# Patient Record
Sex: MALE | Race: WHITE | NOT HISPANIC OR LATINO | Employment: FULL TIME | URBAN - METROPOLITAN AREA
[De-identification: names, ages, dates, MRNs, and addresses within clinical notes are randomized per-mention and may not be internally consistent; named-entity substitution may affect disease eponyms.]

---

## 2019-10-18 ENCOUNTER — OFFICE VISIT (OUTPATIENT)
Dept: INTERNAL MEDICINE CLINIC | Facility: CLINIC | Age: 49
End: 2019-10-18
Payer: OTHER GOVERNMENT

## 2019-10-18 VITALS
WEIGHT: 215.8 LBS | OXYGEN SATURATION: 97 % | HEART RATE: 83 BPM | HEIGHT: 71 IN | TEMPERATURE: 97.9 F | DIASTOLIC BLOOD PRESSURE: 80 MMHG | BODY MASS INDEX: 30.21 KG/M2 | SYSTOLIC BLOOD PRESSURE: 140 MMHG

## 2019-10-18 DIAGNOSIS — E03.9 ACQUIRED HYPOTHYROIDISM: Primary | ICD-10-CM

## 2019-10-18 DIAGNOSIS — E83.52 HYPERCALCEMIA: ICD-10-CM

## 2019-10-18 DIAGNOSIS — E21.0 PRIMARY HYPERPARATHYROIDISM (HCC): ICD-10-CM

## 2019-10-18 DIAGNOSIS — D35.1 PARATHYROID ADENOMA: ICD-10-CM

## 2019-10-18 DIAGNOSIS — M17.0 PRIMARY OSTEOARTHRITIS OF BOTH KNEES: ICD-10-CM

## 2019-10-18 PROBLEM — K64.8 OTHER HEMORRHOIDS: Status: ACTIVE | Noted: 2019-04-09

## 2019-10-18 PROBLEM — F17.200 SMOKING: Status: RESOLVED | Noted: 2019-10-18 | Resolved: 2019-10-18

## 2019-10-18 PROBLEM — F17.200 SMOKING: Status: ACTIVE | Noted: 2019-10-18

## 2019-10-18 PROCEDURE — 99203 OFFICE O/P NEW LOW 30 MIN: CPT | Performed by: INTERNAL MEDICINE

## 2019-10-18 RX ORDER — ERGOCALCIFEROL 1.25 MG/1
50000 CAPSULE ORAL
COMMUNITY
End: 2019-12-19

## 2019-10-18 RX ORDER — MULTIVITAMIN
1 TABLET ORAL DAILY
COMMUNITY

## 2019-10-18 RX ORDER — LEVOTHYROXINE SODIUM 175 UG/1
175 TABLET ORAL DAILY
COMMUNITY
End: 2019-12-19 | Stop reason: ALTCHOICE

## 2019-10-18 NOTE — PROGRESS NOTES
Assessment/Plan:     Diagnoses and all orders for this visit:    Acquired hypothyroidism  -     TSH, 3rd generation with Free T4 reflex; Future  -     Ambulatory referral to Endocrinology; Future    Parathyroid adenoma  -     Ambulatory Referral to Otolaryngology; Future  -     Ambulatory referral to Endocrinology; Future    Primary hyperparathyroidism [facility])  -     Ambulatory Referral to Otolaryngology; Future  -     Ambulatory referral to Endocrinology; Future    Hypercalcemia  -     Ambulatory Referral to Otolaryngology; Future  -     Ambulatory referral to Endocrinology; Future    Primary osteoarthritis of both knees  -     Ambulatory referral to Orthopedic Surgery; Future    Other orders  -     levothyroxine 175 mcg tablet; Take 175 mcg by mouth daily  -     ergocalciferol (VITAMIN D2) 50,000 units; Take 50,000 Units by mouth  -     Omega-3 Fatty Acids (FISH OIL OMEGA-3 PO); Take by mouth  -     Multiple Vitamin (MULTIVITAMIN) tablet; Take 1 tablet by mouth daily          Subjective:      Patient ID: Romayne Glass is a 51 y o  male who is establishing care today and also is here to discuss ongoing medical problems  Bam Sauer is in the [employer], and moved here recently from [place] as his wife is completing her Podiatry residency at [facility]  He brought with him [employer], which are skin to the records for access for review  Bam Sauer is a history of nephrolithiasis and while in [place] this led to lab testing showing idiopathic hypothyroidism with thyroid replacement, currently at a dose of 175 mcg per day    Other tests included blood chemistry showing elevated calcium, ultimately leading to further workup with diagnosis of primary hyperparathyroidism, with imaging study showing a marble-sized parathyroid adenoma on the left, with other 3 glands normal     He is maintaining good fluid intake with no clinical symptoms from hyperparathyroidism including no recurrent kidney stones, no bone pain, no constipation or abdominal pain, no alteration of sensorium  He is seeking follow-up for parathyroid gland removal   We discussed referral to ENT for consideration for surgery, after reviewing imaging studies and he does have a disc that he will bring with him to his ENT visit  We also discussed follow-up with [facility] End[facility]ology which was also range  Carleyrex Felix also has history of heavy use of his knees, as transition to a low-impact exercise program as required by the [employer]  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, both hips, left greater right as he tends to favor weight-bearing on the left leg as he has bilateral knee arthritis but anterior compartment arthritis there is particularly symptomatic in the right knee  He has responded to Synvisc injections and is interested in following with Orthopedics in this regard and was referred  He has a history of smoking having quit 5 years ago  He has no COPD based on history and exam or previous records  His records were reviewed including his healthcare questionnaire, and records from the [facility]  Further history was during interview  HPI  He reports no acute illness today  We discussed follow-up TSH and T4, with titration of thyroid dosing as needed  He was encouraged to call between visits for any questions or problems    The following portions of the patient's history were reviewed and updated as appropriate: allergies, current medications, past family history, past medical history, past social history, past surgical history and problem list     Review of Systems      Objective:      /80 (BP Location: Left arm, Patient Position: Sitting, Cuff Size: Standard)   Pulse 83   Temp 97 9 °F (36 6 °C) (Tympanic)   Ht 5' 11" (1 803 m)   Wt 97 9 kg (215 lb 12 8 oz)   SpO2 97%   BMI 30 10 kg/m²      Wt Readings from Last 3 Encounters:   10/18/19 97 9 kg (215 lb 12 8 oz)     Temp Readings from Last 3 Encounters:   10/18/19 97 9 °F (36 6 °C) (Tympanic)     BP Readings from Last 3 Encounters:   10/18/19 140/80     Pulse Readings from Last 3 Encounters:   10/18/19 83        Physical Exam    Vital signs stable, very pleasant gentleman in no distress  HEENT exam normal other than male pattern baldness  Cognitive status normal exam of neck:  No JVD and no adenopathy, trachea midline  On palpation of anterior neck there is a marble-sized mass and left thyroid lobe region  Lungs are clear and cardiac exam is normal on auscultation  AP diameter of the chest is normal and there is no other chest wall anatomic abnormality  Exam of knees show some mild medial compartment degeneration of both knees, without tenderness or swelling, no popliteal mass or tenderness  There is crepitus on passive range of motion of the right knee over the anterior compartment, with some mild tenderness on gentle palpation and compression of this joint  Distally circulation is intact without phlebitic findings  Patient Active Problem List   Diagnosis    Acquired hypothyroidism    Other hemorrhoids    Primary osteoarthritis of both knees    Hypercalcemia    Primary hyperpar[facility]roidism ([facility] [address] )    Parathyroid adenoma       Current Outpatient Medications on File Prior to Visit   Medication Sig Dispense Refill    ergocalciferol (VITAMIN D2) 50,000 units Take 50,000 Units by mouth      levothyroxine 175 mcg tablet Take 175 mcg by mouth daily      Multiple Vitamin (MULTIVITAMIN) tablet Take 1 tablet by mouth daily      Omega-3 Fatty Acids (FISH OIL OMEGA-3 PO) Take by mouth       No current facility-administered medications on file prior to visit

## 2019-10-31 ENCOUNTER — APPOINTMENT (OUTPATIENT)
Dept: RADIOLOGY | Facility: MEDICAL CENTER | Age: 49
End: 2019-10-31
Payer: OTHER GOVERNMENT

## 2019-10-31 ENCOUNTER — APPOINTMENT (OUTPATIENT)
Dept: LAB | Facility: MEDICAL CENTER | Age: 49
End: 2019-10-31
Payer: OTHER GOVERNMENT

## 2019-10-31 VITALS
SYSTOLIC BLOOD PRESSURE: 124 MMHG | HEIGHT: 71 IN | WEIGHT: 220 LBS | BODY MASS INDEX: 30.8 KG/M2 | HEART RATE: 62 BPM | DIASTOLIC BLOOD PRESSURE: 80 MMHG

## 2019-10-31 DIAGNOSIS — M17.11 PRIMARY OSTEOARTHRITIS OF RIGHT KNEE: ICD-10-CM

## 2019-10-31 DIAGNOSIS — M17.12 PRIMARY OSTEOARTHRITIS OF LEFT KNEE: ICD-10-CM

## 2019-10-31 DIAGNOSIS — M17.0 PRIMARY OSTEOARTHRITIS OF BOTH KNEES: ICD-10-CM

## 2019-10-31 DIAGNOSIS — E03.9 ACQUIRED HYPOTHYROIDISM: ICD-10-CM

## 2019-10-31 DIAGNOSIS — M17.10 PATELLOFEMORAL ARTHRITIS: ICD-10-CM

## 2019-10-31 DIAGNOSIS — M25.562 LEFT KNEE PAIN, UNSPECIFIED CHRONICITY: ICD-10-CM

## 2019-10-31 DIAGNOSIS — G89.29 CHRONIC PAIN OF RIGHT KNEE: ICD-10-CM

## 2019-10-31 DIAGNOSIS — M25.561 CHRONIC PAIN OF RIGHT KNEE: ICD-10-CM

## 2019-10-31 DIAGNOSIS — M17.0 PRIMARY OSTEOARTHRITIS OF BOTH KNEES: Primary | ICD-10-CM

## 2019-10-31 LAB — TSH SERPL DL<=0.05 MIU/L-ACNC: 1.59 UIU/ML (ref 0.36–3.74)

## 2019-10-31 PROCEDURE — 36415 COLL VENOUS BLD VENIPUNCTURE: CPT

## 2019-10-31 PROCEDURE — 84443 ASSAY THYROID STIM HORMONE: CPT

## 2019-10-31 PROCEDURE — 73564 X-RAY EXAM KNEE 4 OR MORE: CPT

## 2019-10-31 PROCEDURE — 99204 OFFICE O/P NEW MOD 45 MIN: CPT | Performed by: ORTHOPAEDIC SURGERY

## 2019-10-31 RX ORDER — DICLOFENAC SODIUM 75 MG/1
75 TABLET, DELAYED RELEASE ORAL 2 TIMES DAILY
Qty: 60 TABLET | Refills: 1 | Status: SHIPPED | OUTPATIENT
Start: 2019-10-31

## 2019-11-05 ENCOUNTER — TELEPHONE (OUTPATIENT)
Dept: INTERNAL MEDICINE CLINIC | Facility: CLINIC | Age: 49
End: 2019-11-05

## 2019-11-05 NOTE — TELEPHONE ENCOUNTER
Spoke to patient, Dr Rosalino Sharma can see the patient on 11/21 at 11:00 am  Pt is aware, he believes he is scheduled to travel that day but he will call me back if there is an issue      appt scheduled at:   [address], [address]  Driving Directions  Phone:  [phone]  Fax:  [phone]

## 2019-11-07 ENCOUNTER — OFFICE VISIT (OUTPATIENT)
Dept: OTOLARYNGOLOGY | Facility: CLINIC | Age: 49
End: 2019-11-07
Payer: OTHER GOVERNMENT

## 2019-11-07 ENCOUNTER — HOSPITAL ENCOUNTER (OUTPATIENT)
Dept: MRI IMAGING | Facility: HOSPITAL | Age: 49
Discharge: HOME | End: 2019-11-07
Attending: ORTHOPAEDIC SURGERY
Payer: OTHER GOVERNMENT

## 2019-11-07 VITALS
RESPIRATION RATE: 17 BRPM | HEART RATE: 70 BPM | SYSTOLIC BLOOD PRESSURE: 132 MMHG | DIASTOLIC BLOOD PRESSURE: 84 MMHG | BODY MASS INDEX: 29.89 KG/M2 | WEIGHT: 213.5 LBS | HEIGHT: 71 IN

## 2019-11-07 DIAGNOSIS — M17.11 PRIMARY OSTEOARTHRITIS OF RIGHT KNEE: ICD-10-CM

## 2019-11-07 DIAGNOSIS — G89.29 CHRONIC PAIN OF RIGHT KNEE: ICD-10-CM

## 2019-11-07 DIAGNOSIS — E03.8 OTHER SPECIFIED HYPOTHYROIDISM: ICD-10-CM

## 2019-11-07 DIAGNOSIS — M25.561 CHRONIC PAIN OF RIGHT KNEE: ICD-10-CM

## 2019-11-07 DIAGNOSIS — E21.3 HYPERPARATHYROIDISM (HCC): Primary | ICD-10-CM

## 2019-11-07 DIAGNOSIS — M17.10 PATELLOFEMORAL ARTHRITIS: ICD-10-CM

## 2019-11-07 PROCEDURE — 73721 MRI JNT OF LWR EXTRE W/O DYE: CPT

## 2019-11-07 PROCEDURE — 99204 OFFICE O/P NEW MOD 45 MIN: CPT | Performed by: OTOLARYNGOLOGY

## 2019-11-07 NOTE — PROGRESS NOTES
Otolaryngology Head and Neck Surgery History and Physical    Chief complaint    Chief Complaint   Patient presents with    Parathyroid Adenoma        History of the Present Illness    Nury Victoria is a 51 y o  who had kidney stones, under workup was noticed to have hypercalcemia  This prompted additional workup to rule out parathyroid adenoma  Parathyroid hormone was confirmed to be elevated, and a sestamibi scan was requested  This suggested location of the adenoma behind the left lower pole of the thyroid  A CT scan neck with contrast was also performed and confirmed the presence of a 16 mm ovoid mass highly suggestive of a parathyroid adenoma on the tracheoesophageal groove on the left side  Incidentally patient has also been recently diagnosed as having hypothyroidism and is on hormonal supplementation with levothyroxine  Review of Systems   Constitutional: Positive for fatigue  HENT: Negative  Eyes: Positive for visual disturbance  Respiratory: Negative  Cardiovascular: Negative  Gastrointestinal: Negative  Endocrine: Negative  Genitourinary: Negative  Musculoskeletal: Positive for back pain  Skin: Negative  Allergic/Immunologic: Negative  Neurological: Positive for headaches  Hematological: Negative  Psychiatric/Behavioral: Negative  Complete review of systems done as above  History reviewed  No pertinent past medical history  History reviewed  No pertinent surgical history      Social History     Socioeconomic History    Marital status: /Civil Union     Spouse name: Not on file    Number of children: Not on file    Years of education: Not on file    Highest education level: Not on file   Occupational History    Not on file   Social Needs    Financial resource strain: Not on file    Food insecurity:     Worry: Not on file     Inability: Not on file    Transportation needs:     Medical: Not on file     Non-medical: Not on file Tobacco Use    Smoking status: Never Smoker    Smokeless tobacco: Never Used   Substance and Sexual Activity    Alcohol use: Yes     Comment: ocassionally     Drug use: Never    Sexual activity: Yes   Lifestyle    Physical activity:     Days per week: Not on file     Minutes per session: Not on file    Stress: Not on file   Relationships    Social connections:     Talks on phone: Not on file     Gets together: Not on file     Attends [employer] service: Not on file     Active member of club or organization: Not on file     Attends meetings of clubs or organizations: Not on file     Relationship status: Not on file    Intimate partner violence:     Fear of current or ex partner: Not on file     Emotionally abused: Not on file     Physically abused: Not on file     Forced sexual activity: Not on file   Other Topics Concern    Not on file   Social History Narrative    Not on file       History reviewed  No pertinent family history  /84 (BP Location: Right arm, Patient Position: Sitting, Cuff Size: Standard)   Pulse 70   Resp 17   Ht 5' 11" (1 803 m)   Wt 96 8 kg (213 lb 8 oz)   BMI 29 78 kg/m²       Current Outpatient Medications:     diclofenac (VOLTAREN) 75 mg EC tablet, Take 1 tablet (75 mg total) by mouth 2 (two) times a day PRN for pain, Disp: 60 tablet, Rfl: 1    ergocalciferol (VITAMIN D2) 50,000 units, Take 50,000 Units by mouth, Disp: , Rfl:     levothyroxine 175 mcg tablet, Take 175 mcg by mouth daily, Disp: , Rfl:     Multiple Vitamin (MULTIVITAMIN) tablet, Take 1 tablet by mouth daily, Disp: , Rfl:     Omega-3 Fatty Acids (FISH OIL OMEGA-3 PO), Take by mouth, Disp: , Rfl:      Physical Exam        Constitutional: Oriented to person, place, and time  Well-developed and well-nourished, no apparent distress, non-toxic appearance  Cooperative, able to hear and answer questions without difficulty  Voice: Normal voice quality  Head: Normocephalic, atraumatic    No scars, masses or lesions  Face: Symmetric, no edema, no sinus tenderness  Eyes: Vision grossly intact, extra-ocular movement intact  Right Ear: External ear normal   Auditory canal clear  Tympanic membrane well-appearing, without retraction or scarring  No fluid present  No post-auricular erythema or tenderness  Left Ear: External ear normal   Auditory canal clear  Tympanic membrane well-appearing, without retraction or scarring  No fluid present  No post-auricular erythema or tenderness  Nose: Septum midline, nares clear  Mucosa moist, turbinates well appearing  No crusting, polyps or discharge evident  Oral cavity: Dentition intact  Mucosa moist, lips normal   Tongue mobile, floor of mouth normal   Hard palate unremarkable  No masses or lesions  Oropharynx: Uvula is midline, soft palate normal   Unremarkable oropharyngeal inlet  Tonsils unremarkable  Posterior pharyngeal wall clear  No masses or lesions  Salivary glands:  Parotid glands and submandibular glands symmetric, no enlargement or tenderness  Neck: Normal laryngeal elevation with swallow  Trachea midline  No masses or lesions  No palpable adenopathy  Thyroid: normal in size, unremarkable without tenderness or palpable nodules  Pulmonary/Chest: Normal effort and rate  No respiratory distress  Musculoskeletal: Normal range of motion  Neurological: Cranial nerves 2-12 intact  Skin: Skin is warm and dry  Psychiatric: Normal mood and affect  Imaging studies:  I personally reviewed images from CT provided by the patient  This confirms the presence of a parathyroid adenoma or a ovoid mass very suspicious for a parathyroid adenoma on the tracheoesophageal groove on the left side  Pertinent laboratory data:  Parathyroid hormone 65 pg/mL with ionized calcium of 1 4, total calcium 10 0 (pages 36 - 37 of the copy of documents from [facility]           Assessment and plan:    1   Hyperparathyroidism ([facility] [address] )  PTH, Intact and Calcium   2  Other specified hypothyroidism       Patient with clinical picture very consistent with hyperparathyroidism secondary to a parathyroid adenoma, parathyroid hormone is within the normal range despite a very elevated calcium  I did request an updated PTH and calcium levels  With confirmation of this values he will need surgery to remove the parathyroid adenoma

## 2019-11-12 ENCOUNTER — APPOINTMENT (OUTPATIENT)
Dept: RADIOLOGY | Facility: MEDICAL CENTER | Age: 49
End: 2019-11-12
Payer: OTHER GOVERNMENT

## 2019-11-12 VITALS
WEIGHT: 211 LBS | DIASTOLIC BLOOD PRESSURE: 77 MMHG | BODY MASS INDEX: 29.54 KG/M2 | HEART RATE: 66 BPM | SYSTOLIC BLOOD PRESSURE: 115 MMHG | HEIGHT: 71 IN

## 2019-11-12 DIAGNOSIS — M25.531 RIGHT WRIST PAIN: ICD-10-CM

## 2019-11-12 DIAGNOSIS — M25.511 PAIN IN RIGHT SHOULDER: Primary | ICD-10-CM

## 2019-11-12 DIAGNOSIS — M25.511 RIGHT SHOULDER PAIN, UNSPECIFIED CHRONICITY: ICD-10-CM

## 2019-11-12 DIAGNOSIS — M25.531 PAIN IN RIGHT WRIST: ICD-10-CM

## 2019-11-12 DIAGNOSIS — M25.511 CHRONIC RIGHT SHOULDER PAIN: Primary | ICD-10-CM

## 2019-11-12 DIAGNOSIS — G89.29 CHRONIC RIGHT SHOULDER PAIN: Primary | ICD-10-CM

## 2019-11-12 PROCEDURE — 99213 OFFICE O/P EST LOW 20 MIN: CPT | Performed by: ORTHOPAEDIC SURGERY

## 2019-11-12 PROCEDURE — 73030 X-RAY EXAM OF SHOULDER: CPT

## 2019-11-12 NOTE — PROGRESS NOTES
Orthopaedic Surgery - Office Note  Taurus Louis (51 y o  male)   : 1970   MRN: [id number]  Encounter Date: 2019    Chief Complaint   Patient presents with    Right Shoulder - Pain       Assessment / Plan  Chronic right shoulder pain     · MRI Arthrogram of right shoulder  Return for Recheck after MRI  History of Present Illness  Taurus Louis is a 51 y o  male who presents with right shoulder pain that has been ongoing for the past 2 years  Patient states that he fell backwards on an outstretched arm and braced his fall with his right arm while on rollerskates  Patient states at that time he was treated for a humeral head bone bruise where he did physical therapy for 9 months  Patient states that he never fully recovered from this injury and notices limited range of motion and a pretty moderate intermittent sharp and achy pain deep inside his shoulder joint  Patient describes difficulties doing bar hangs  Patient is in the [employer]  He also describes pain when lifting heavy items and putting them up on a shelf overhead  Patient denies numbness and tingling  Review of Systems  Pertinent items are noted in HPI  All other systems were reviewed and are negative  Physical Exam  /77   Pulse 66   Ht 5' 11" (1 803 m)   Wt 95 7 kg (211 lb)   BMI 29 43 kg/m²   Cons: Appears well  No apparent distress  Psych: Alert  Oriented x3  Mood and affect normal   Eyes: PERRLA, EOMI  Resp: Normal effort  No audible wheezing or stridor  CV: Palpable pulse  No discernable arrhythmia  No LE edema  Lymph:  No palpable cervical, axillary, or inguinal lymphadenopathy  Skin: Warm  No palpable masses  No visible lesions  Neuro: Normal muscle tone  Normal and symmetric DTR's  Right Shoulder Exam  Alignment / Posture:  Normal shoulder posture  Inspection:  No swelling  Palpation:  posterior glenohumeral joint tenderness  ROM:  Shoulder   Shoulder ER 70   Shoulder IR T8   Strength:  5/5 supraspinatus, infraspinatus, and subscapularis  Stability:  No objective shoulder instability  Tests: (+) Donald  (+) Meryl Homans  (-) Neer  (-) Belly press  (-) Speed  (-) Throwing test   Neurovascular:  Sensation intact in Ax/R/M/U nerve distributions  2+ radial pulse  Studies Reviewed  I have personally reviewed pertinent films in PACS and my interpretation is xray of right shoulder on 11/12/19 demonstrates no acute fractures or dislocations without degenerative changes  Procedures  No procedures today  Medical, Surgical, Family, and Social History  The patient's medical history, family history, and social history, were reviewed and updated as appropriate  History reviewed  No pertinent past medical history  History reviewed  No pertinent surgical history  History reviewed  No pertinent family history      Social History     Occupational History    Not on file   Tobacco Use    Smoking status: Never Smoker    Smokeless tobacco: Never Used   Substance and Sexual Activity    Alcohol use: Yes     Comment: ocassionally     Drug use: Never    Sexual activity: Yes       Allergies   Allergen Reactions    Meloxicam Dermatitis         Current Outpatient Medications:     diclofenac (VOLTAREN) 75 mg EC tablet, Take 1 tablet (75 mg total) by mouth 2 (two) times a day PRN for pain, Disp: 60 tablet, Rfl: 1    ergocalciferol (VITAMIN D2) 50,000 units, Take 50,000 Units by mouth, Disp: , Rfl:     levothyroxine 175 mcg tablet, Take 175 mcg by mouth daily, Disp: , Rfl:     Multiple Vitamin (MULTIVITAMIN) tablet, Take 1 tablet by mouth daily, Disp: , Rfl:     Omega-3 Fatty Acids (FISH OIL OMEGA-3 PO), Take by mouth, Disp: , Rfl:         Scribe Attestation    I,:   Susan Dobson am acting as a scribe while in the presence of the attending physician :        I,:   Gorge Quispe MD personally performed the services described in this documentation    as scribed in my presence :

## 2019-11-14 VITALS
HEART RATE: 61 BPM | BODY MASS INDEX: 30.1 KG/M2 | WEIGHT: 215 LBS | SYSTOLIC BLOOD PRESSURE: 124 MMHG | HEIGHT: 71 IN | DIASTOLIC BLOOD PRESSURE: 72 MMHG

## 2019-11-14 DIAGNOSIS — S63.8X1A TEAR OF RIGHT SCAPHOLUNATE LIGAMENT, INITIAL ENCOUNTER: Primary | ICD-10-CM

## 2019-11-14 DIAGNOSIS — M67.439 DORSAL WRIST GANGLION: ICD-10-CM

## 2019-11-14 DIAGNOSIS — M25.531 RIGHT WRIST PAIN: ICD-10-CM

## 2019-11-14 PROCEDURE — 20612 ASPIRATE/INJ GANGLION CYST: CPT | Performed by: ORTHOPAEDIC SURGERY

## 2019-11-14 PROCEDURE — 99214 OFFICE O/P EST MOD 30 MIN: CPT | Performed by: ORTHOPAEDIC SURGERY

## 2019-11-14 RX ADMIN — LIDOCAINE HYDROCHLORIDE 1 ML: 10 INJECTION, SOLUTION INFILTRATION; PERINEURAL at 17:27

## 2019-11-14 RX ADMIN — BETAMETHASONE SODIUM PHOSPHATE AND BETAMETHASONE ACETATE 6 MG: 3; 3 INJECTION, SUSPENSION INTRA-ARTICULAR; INTRALESIONAL; INTRAMUSCULAR; SOFT TISSUE at 17:27

## 2019-11-14 NOTE — LETTER
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 [facility] [employer] presents with right wrist pain  He has had multiple months of wrist pain  He denies any previous injury  He notes that his worst with weight-bearing with his wrist extension  Denies numbness or tingling  Has occasional popping in the wrist   Notes some weakness  No wrist brace or injections given  History reviewed  No pertinent past medical history  Past Surgical History:   Procedure Laterality Date    KIDNEY STONE SURGERY      VARICOCELE EXCISION         Allergies   Allergen Reactions    Meloxicam Dermatitis       Current Outpatient Medications on File Prior to Visit   Medication Sig Dispense Refill    diclofenac (VOLTAREN) 75 mg EC tablet Take 1 tablet (75 mg total) by mouth 2 (two) times a day PRN for pain 60 tablet 1    ergocalciferol (VITAMIN D2) 50,000 units Take 50,000 Units by mouth      levothyroxine 175 mcg tablet Take 175 mcg by mouth daily      Multiple Vitamin (MULTIVITAMIN) tablet Take 1 tablet by mouth daily      Omega-3 Fatty Acids (FISH OIL OMEGA-3 PO) Take by mouth       No current facility-administered medications on file prior to visit          Social History     Tobacco Use    Smoking status: Former Smoker     Start date: 2013    Smokeless tobacco: Current User     Types: Chew   Substance Use Topics    Alcohol use: Yes     Frequency: 2-4 times a month     Comment: ocassionally     Drug use: Never       Family History   Problem Relation Age of Onset    Diabetes Mother     No Known Problems Father        Review of Systems     As stated in the HPI  All other systems were reviewed and are negative  Physical Exam     /72   Pulse 61   Ht 5' 11" (1 803 m)   Wt 97 5 kg (215 lb)   BMI 29 99 kg/m²      GENERAL: This is a well-developed, well-nourished, age-appropriate patient in no acute distress  The patient is alert and oriented x3  Pleasant and cooperative  Eyes: Anicteric sclerae  Extraocular movements appear intact  HENT: Nares are patent with no drainage  Lungs: There is equal chest rise on inspection  Breathing is non-labored with no audible wheezing  Cardiovascular: No cyanosis  No upper extremity lymphadema  Skin: Skin is warm to touch  No obvious skin lesions or rashes other than described below  Neurologic: No ataxia  Psychiatric: Mood and affect are appropriate  Examination the right upper extremity reveals a small lobular mass that is visible with terminal wrist flexion on the dorsal radial wrist   It is palpable and tender at the SL interval   Full digital range of motion  5/5 Motor to the APB, FDI, FDP2, FDP5, EDC  Sensation intact to light touch in the median, radial, and ulnar nerve distribution  Brisk capillary refill      Data Review     Results Reviewed     None             Imaging: Three-view imaging of the right wrist taken today and reviewed personally by me shows no evidence of SL diastasis distal or arthritis  Assessment and Plan      Diagnoses and all orders for this visit:    Right wrist pain  -     Ambulatory referral to Orthopedic Surgery  -     XR wrist 3+ vw right; Future           Right dorsal wrist ganglion    I discussed the etiology and natural course of a ganglion with the patient    We discussed that these originate from joints or tendons and often have a stalk that comes out from the structure that axis of valve to move fluid from either the joint or the tendon into a balloon like structure  The fluid can move into the ganglion and allowed to grow, or recede  This is often based on activity and inflammation  Treatment of this condition was discussed as well  Nonsurgical management involves watchful waiting as many of these will resolve with rest, bracing, and anti-inflammatory medications, or an aspiration and injection with corticosteroid  Aspiration and injection, specifically of dorsal wrist ganglions, typically leads to alleviation of the ganglion in about 50% of patients  Surgical excision was also discussed and this comes with an 80% success rate for volar wrist ganglions and 92% success rate with dorsal wrist ganglions in terms of prevention of recurrence  Risks of the injection including pain, infection, tendon rupture, progression of arthritis, fat atrophy, depigmentation, and worsening of symptoms were all discussed with the patient  There was good understanding by the patient and they wish to proceed  Patient elected for aspiration injection today  We will also get him a wrist brace to wear during activities only for the next 4 weeks  Follow Up:  P r n      To Do Next Visit:     PROCEDURES PERFORMED:  Hand/upper extremity injection  Date/Time: 11/14/2019 5:27 PM  Consent given by: patient  Site marked: site marked  Timeout: Immediately prior to procedure a time out was called to verify the correct patient, procedure, equipment, support staff and site/side marked as required   Supporting Documentation  Indications: joint swelling and pain   Procedure Details  Condition:dorsal carpal ganglion Preparation: Patient was prepped and draped in the usual sterile fashion  Needle size: 25 G  Approach: dorsal  Medications administered: 1 mL lidocaine 1 %; 6 mg betamethasone acetate-betamethasone sodium phosphate 6 (3-3) mg/mL    Patient tolerance: patient tolerated the procedure well with no immediate complications  Dressing:  Sterile dressing applied

## 2019-11-14 NOTE — LETTER
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[employer] [facility] presents with right wrist pain  He has had multiple months of wrist pain  He denies any previous injury  He notes that his worst with weight-bearing with his wrist extension  Denies numbness or tingling  Has occasional popping in the wrist   Notes some weakness  No wrist brace or injections given  History reviewed  No pertinent past medical history  Past Surgical History:   Procedure Laterality Date    KIDNEY STONE SURGERY      VARICOCELE EXCISION         Allergies   Allergen Reactions    Meloxicam Dermatitis       Current Outpatient Medications on File Prior to Visit   Medication Sig Dispense Refill    diclofenac (VOLTAREN) 75 mg EC tablet Take 1 tablet (75 mg total) by mouth 2 (two) times a day PRN for pain 60 tablet 1    ergocalciferol (VITAMIN D2) 50,000 units Take 50,000 Units by mouth      levothyroxine 175 mcg tablet Take 175 mcg by mouth daily      Multiple Vitamin (MULTIVITAMIN) tablet Take 1 tablet by mouth daily      Omega-3 Fatty Acids (FISH OIL OMEGA-3 PO) Take by mouth       No current facility-administered medications on file prior to visit          Social History     Tobacco Use    Smoking status: Former Smoker     Start date: 2013    Smokeless tobacco: Current User     Types: Chew   Substance Use Topics    Alcohol use: Yes     Frequency: 2-4 times a month     Comment: ocassionally     Drug use: Never       Family History   Problem Relation Age of Onset    Diabetes Mother     No Known Problems Father        Review of Systems     As stated in the HPI  All other systems were reviewed and are negative  Physical Exam     /72   Pulse 61   Ht 5' 11" (1 803 m)   Wt 97 5 kg (215 lb)   BMI 29 99 kg/m²      GENERAL: This is a well-developed, well-nourished, age-appropriate patient in no acute distress  The patient is alert and oriented x3  Pleasant and cooperative  Eyes: Anicteric sclerae  Extraocular movements appear intact  HENT: Nares are patent with no drainage  Lungs: There is equal chest rise on inspection  Breathing is non-labored with no audible wheezing  Cardiovascular: No cyanosis  No upper extremity lymphadema  Skin: Skin is warm to touch  No obvious skin lesions or rashes other than described below  Neurologic: No ataxia  Psychiatric: Mood and affect are appropriate  Examination the right upper extremity reveals a small lobular mass that is visible with terminal wrist flexion on the dorsal radial wrist   It is palpable and tender at the SL interval   Full digital range of motion  5/5 Motor to the APB, FDI, FDP2, FDP5, EDC  Sensation intact to light touch in the median, radial, and ulnar nerve distribution  Brisk capillary refill      Data Review     Results Reviewed     None             Imaging: Three-view imaging of the right wrist taken today and reviewed personally by me shows evidence of SL diastasis distal with no arthritis    Assessment and Plan      Diagnoses and all orders for this visit:    Right wrist pain  -     Ambulatory referral to Orthopedic Surgery  -     XR wrist 3+ vw right; Future           Right dorsal wrist ganglion    I discussed the etiology and natural course of a ganglion with the patient    We discussed that these originate from joints or tendons and often have a stalk that comes out from the structure that axis of valve to move fluid from either the joint or the tendon into a balloon like structure  The fluid can move into the ganglion and allowed to grow, or recede  This is often based on activity and inflammation  Treatment of this condition was discussed as well  Nonsurgical management involves watchful waiting as many of these will resolve with rest, bracing, and anti-inflammatory medications, or an aspiration and injection with corticosteroid  Aspiration and injection, specifically of dorsal wrist ganglions, typically leads to alleviation of the ganglion in about 50% of patients  Surgical excision was also discussed and this comes with an 80% success rate for volar wrist ganglions and 92% success rate with dorsal wrist ganglions in terms of prevention of recurrence  Risks of the injection including pain, infection, tendon rupture, progression of arthritis, fat atrophy, depigmentation, and worsening of symptoms were all discussed with the patient  There was good understanding by the patient and they wish to proceed  Patient elected for aspiration injection today  We will also get him a wrist brace to wear during activities only for the next 4 weeks  Follow Up:  P r n      To Do Next Visit:     PROCEDURES PERFORMED:  Hand/upper extremity injection  Date/Time: 11/14/2019 5:27 PM  Consent given by: patient  Site marked: site marked  Timeout: Immediately prior to procedure a time out was called to verify the correct patient, procedure, equipment, support staff and site/side marked as required   Supporting Documentation  Indications: joint swelling and pain   Procedure Details  Condition:dorsal carpal ganglion Preparation: Patient was prepped and draped in the usual sterile fashion  Needle size: 25 G  Approach: dorsal  Medications administered: 1 mL lidocaine 1 %; 6 mg betamethasone acetate-betamethasone sodium phosphate 6 (3-3) mg/mL    Patient tolerance: patient tolerated the procedure well with no immediate complications  Dressing:  Sterile dressing applied

## 2019-11-14 NOTE — LETTER
November 14, 2019     Patient: Jefry Dejesus   YOB: 1970   Date of Visit: 11/14/2019       To Whom it May Concern:    Jefry Dejesus is under my professional care  He was seen in my office on 11/14/2019  He should remain limited from activities which involve any pushups  He should be able to use his wrist brace on the right wrist during any other activities  He will follow up with me in 1 month for repeat evaluation    If you have any questions or concerns, please don't hesitate to call           Sincerely,          Thomas Noriega MD        CC: No Recipients

## 2019-11-14 NOTE — PROGRESS NOTES
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  He has had multiple months of wrist pain  He denies any previous injury  He notes that his worst with weight-bearing with his wrist extension  Denies numbness or tingling  Has occasional popping in the wrist   Notes some weakness  No wrist brace or injections given  History reviewed  No pertinent past medical history  Past Surgical History:   Procedure Laterality Date    KIDNEY STONE SURGERY      VARICOCELE EXCISION         Allergies   Allergen Reactions    Meloxicam Dermatitis       Current Outpatient Medications on File Prior to Visit   Medication Sig Dispense Refill    diclofenac (VOLTAREN) 75 mg EC tablet Take 1 tablet (75 mg total) by mouth 2 (two) times a day PRN for pain 60 tablet 1    ergocalciferol (VITAMIN D2) 50,000 units Take 50,000 Units by mouth      levothyroxine 175 mcg tablet Take 175 mcg by mouth daily      Multiple Vitamin (MULTIVITAMIN) tablet Take 1 tablet by mouth daily      Omega-3 Fatty Acids (FISH OIL OMEGA-3 PO) Take by mouth       No current facility-administered medications on file prior to visit  Social History     Tobacco Use    Smoking status: Former Smoker     Start date: 2013    Smokeless tobacco: Current User     Types: Chew   Substance Use Topics    Alcohol use: Yes     Frequency: 2-4 times a month     Comment: ocassionally     Drug use: Never       Family History   Problem Relation Age of Onset    Diabetes Mother     No Known Problems Father        Review of Systems     As stated in the HPI  All other systems were reviewed and are negative        Physical Exam     /72   Pulse 61   Ht 5' 11" (1 803 m)   Wt 97 5 kg (215 lb)   BMI 29 99 kg/m²     GENERAL: This is a well-developed, well-nourished, age-appropriate patient in no acute distress  The patient is alert and oriented x3  Pleasant and cooperative  Eyes: Anicteric sclerae  Extraocular movements appear intact  HENT: Nares are patent with no drainage  Lungs: There is equal chest rise on inspection  Breathing is non-labored with no audible wheezing  Cardiovascular: No cyanosis  No upper extremity lymphadema  Skin: Skin is warm to touch  No obvious skin lesions or rashes other than described below  Neurologic: No ataxia  Psychiatric: Mood and affect are appropriate  Examination the right upper extremity reveals a small lobular mass that is visible with terminal wrist flexion on the dorsal radial wrist   It is palpable and tender at the SL interval   Full digital range of motion  5/5 Motor to the APB, FDI, FDP2, FDP5, EDC  Sensation intact to light touch in the median, radial, and ulnar nerve distribution  Brisk capillary refill      Data Review     Results Reviewed     None             Imaging: Three-view imaging of the right wrist taken today and reviewed personally by me shows evidence of SL diastasis distal with no arthritis    Assessment and Plan      Diagnoses and all orders for this visit:    Right wrist pain  -     Ambulatory referral to Orthopedic Surgery  -     XR wrist 3+ vw right; Future           Right dorsal wrist ganglion    I discussed the etiology and natural course of a ganglion with the patient  We discussed that these originate from joints or tendons and often have a stalk that comes out from the structure that axis of valve to move fluid from either the joint or the tendon into a balloon like structure  The fluid can move into the ganglion and allowed to grow, or recede  This is often based on activity and inflammation  Treatment of this condition was discussed as well    Nonsurgical management involves watchful waiting as many of these will resolve with rest, bracing, and anti-inflammatory medications, or an aspiration and injection with corticosteroid  Aspiration and injection, specifically of dorsal wrist ganglions, typically leads to alleviation of the ganglion in about 50% of patients  Surgical excision was also discussed and this comes with an 80% success rate for volar wrist ganglions and 92% success rate with dorsal wrist ganglions in terms of prevention of recurrence  Risks of the injection including pain, infection, tendon rupture, progression of arthritis, fat atrophy, depigmentation, and worsening of symptoms were all discussed with the patient  There was good understanding by the patient and they wish to proceed  Patient elected for aspiration injection today  We will also get him a wrist brace to wear during activities only for the next 4 weeks  Follow Up:  P r n      To Do Next Visit:     PROCEDURES PERFORMED:  Hand/upper extremity injection  Date/Time: 11/14/2019 5:27 PM  Consent given by: patient  Site marked: site marked  Timeout: Immediately prior to procedure a time out was called to verify the correct patient, procedure, equipment, support staff and site/side marked as required   Supporting Documentation  Indications: joint swelling and pain   Procedure Details  Condition:dorsal carpal ganglion Preparation: Patient was prepped and draped in the usual sterile fashion  Needle size: 25 G  Approach: dorsal  Medications administered: 1 mL lidocaine 1 %; 6 mg betamethasone acetate-betamethasone sodium phosphate 6 (3-3) mg/mL    Patient tolerance: patient tolerated the procedure well with no immediate complications  Dressing:  Sterile dressing applied

## 2019-11-15 ENCOUNTER — OFFICE VISIT (OUTPATIENT)
Dept: OBGYN CLINIC | Facility: MEDICAL CENTER | Age: 49
End: 2019-11-15
Payer: OTHER GOVERNMENT

## 2019-11-15 ENCOUNTER — TELEPHONE (OUTPATIENT)
Dept: OBGYN CLINIC | Facility: HOSPITAL | Age: 49
End: 2019-11-15

## 2019-11-15 ENCOUNTER — APPOINTMENT (OUTPATIENT)
Dept: RADIOLOGY | Facility: MEDICAL CENTER | Age: 49
End: 2019-11-15
Payer: OTHER GOVERNMENT

## 2019-11-15 VITALS
HEART RATE: 94 BPM | BODY MASS INDEX: 29.82 KG/M2 | SYSTOLIC BLOOD PRESSURE: 134 MMHG | WEIGHT: 213 LBS | DIASTOLIC BLOOD PRESSURE: 79 MMHG | HEIGHT: 71 IN

## 2019-11-15 DIAGNOSIS — M25.531 RIGHT WRIST PAIN: ICD-10-CM

## 2019-11-15 DIAGNOSIS — G89.29 CHRONIC PAIN OF RIGHT KNEE: ICD-10-CM

## 2019-11-15 DIAGNOSIS — M25.561 CHRONIC PAIN OF RIGHT KNEE: ICD-10-CM

## 2019-11-15 DIAGNOSIS — M17.11 PRIMARY OSTEOARTHRITIS OF RIGHT KNEE: Primary | ICD-10-CM

## 2019-11-15 DIAGNOSIS — M17.10 PATELLOFEMORAL ARTHRITIS: ICD-10-CM

## 2019-11-15 PROCEDURE — 99213 OFFICE O/P EST LOW 20 MIN: CPT | Performed by: ORTHOPAEDIC SURGERY

## 2019-11-15 PROCEDURE — 73110 X-RAY EXAM OF WRIST: CPT

## 2019-11-15 NOTE — PROGRESS NOTES
Assessment/Plan:  1  Primary osteoarthritis of right knee    2  Chronic pain of right knee    3  Patellofemoral arthritis      · Continue activity as tolerated  · Continue Tylenol up to 3000mg per day and Diclofenac prn pain   · SynviscOne has been ordered, no approval yet  Patient informed that someone will call him to schedule injection appointment when approval comes through  Return for viscosupplementation when available  I answered all of the patient's questions during the visit and provided education of the patient's condition during the visit  The patient verbalized understanding of the information given and agrees with the plan  This note was dictated using [employer] software  It may contain errors including improperly dictated words  Please contact physician directly for any questions  Subjective   Chief Complaint:   Chief Complaint   Patient presents with    Right Knee - Follow-up       Christine Ibarra is a 51 y o  male who presents for follow up for Right knee pain  Patient is here today to discuss MRI right knee  He states he is doing the same since the last office visit  He notes constant dull pain over anterior right knee  Denies any instability  Pain is worse with kneeling and going up and down steps  Denies any locking or instability  He is taking Diclofenac and Tylenol prn for pain  Review of Systems  ROS:    See HPI for musculoskeletal review  All other systems reviewed are negative     History:  No past medical history on file    Past Surgical History:   Procedure Laterality Date    KIDNEY STONE SURGERY      VARICOCELE EXCISION       Social History   Social History     Substance and Sexual Activity   Alcohol Use Yes    Frequency: 2-4 times a month    Comment: ocassionally      Social History     Substance and Sexual Activity   Drug Use Never     Social History     Tobacco Use   Smoking Status Former Smoker    Start date: 2013   Smokeless Tobacco Current User    Types: Chew Family History:   Family History   Problem Relation Age of Onset    Diabetes Mother     No Known Problems Father        Current Outpatient Medications on File Prior to Visit   Medication Sig Dispense Refill    diclofenac (VOLTAREN) 75 mg EC tablet Take 1 tablet (75 mg total) by mouth 2 (two) times a day PRN for pain 60 tablet 1    ergocalciferol (VITAMIN D2) 50,000 units Take 50,000 Units by mouth      levothyroxine 175 mcg tablet Take 175 mcg by mouth daily      Multiple Vitamin (MULTIVITAMIN) tablet Take 1 tablet by mouth daily      Omega-3 Fatty Acids (FISH OIL OMEGA-3 PO) Take by mouth       No current facility-administered medications on file prior to visit  Allergies   Allergen Reactions    Meloxicam Dermatitis        Objective     /79   Pulse 94   Ht 5' 11" (1 803 m)   Wt 96 6 kg (213 lb)   BMI 29 71 kg/m²      PE:  AAOx 3  WDWN  Hearing intact, no drainage from eyes  no audible wheezing  no abdominal distension  LE compartments soft, skin intact    Ortho Exam:  right Knee:   No erythema  no swelling  no effusion  no warmth  AROM: full, +patellofemoral crepitus with PROM  Stable to varus/valgus stress    Imaging Studies: I have personally reviewed pertinent films in PACS  MRI right knee: chondral fissuring and subchondral inflammation in all 3 compartments, worse in patellofemoral compartment  No meniscal or ligamentous pathology

## 2019-11-18 RX ORDER — LIDOCAINE HYDROCHLORIDE 10 MG/ML
1 INJECTION, SOLUTION INFILTRATION; PERINEURAL
Status: COMPLETED | OUTPATIENT
Start: 2019-11-14 | End: 2019-11-14

## 2019-11-18 RX ORDER — BETAMETHASONE SODIUM PHOSPHATE AND BETAMETHASONE ACETATE 3; 3 MG/ML; MG/ML
6 INJECTION, SUSPENSION INTRA-ARTICULAR; INTRALESIONAL; INTRAMUSCULAR; SOFT TISSUE
Status: COMPLETED | OUTPATIENT
Start: 2019-11-14 | End: 2019-11-14

## 2019-11-20 ENCOUNTER — OFFICE VISIT (OUTPATIENT)
Dept: OBGYN CLINIC | Facility: MEDICAL CENTER | Age: 49
End: 2019-11-20
Payer: OTHER GOVERNMENT

## 2019-11-20 VITALS
BODY MASS INDEX: 29.68 KG/M2 | SYSTOLIC BLOOD PRESSURE: 117 MMHG | DIASTOLIC BLOOD PRESSURE: 73 MMHG | HEART RATE: 58 BPM | WEIGHT: 212 LBS | HEIGHT: 71 IN

## 2019-11-20 DIAGNOSIS — M17.11 PRIMARY OSTEOARTHRITIS OF RIGHT KNEE: Primary | ICD-10-CM

## 2019-11-20 DIAGNOSIS — G89.29 CHRONIC PAIN OF RIGHT KNEE: ICD-10-CM

## 2019-11-20 DIAGNOSIS — M25.561 CHRONIC PAIN OF RIGHT KNEE: ICD-10-CM

## 2019-11-20 PROCEDURE — 20610 DRAIN/INJ JOINT/BURSA W/O US: CPT | Performed by: ORTHOPAEDIC SURGERY

## 2019-11-20 NOTE — PROGRESS NOTES
Assessment/Plan:  1  Primary osteoarthritis of right knee    2  Chronic pain of right knee      Orders Placed This Encounter   Procedures    Large joint arthrocentesis: R knee     Received right knee synvisc one injection today  Patient knows to ice and avoid strenuous activity for 1-2 days if needed  Continue diclofenac prn pain    Return if symptoms worsen or fail to improve  I answered all of the patient's questions during the visit and provided education of the patient's condition during the visit  The patient verbalized understanding of the information given and agrees with the plan  This note was dictated using [employer] software  It may contain errors including improperly dictated words  Please contact physician directly for any questions  Subjective   Chief Complaint: No chief complaint on file  Suzy Figueroa is a 51 y o  male who presents for follow up for his RIGHT knee osteoarthritis  He takes the Tylenol as needed for pain  He had a previous MRI of the right knee to r/o internal derangement  He had pain over the patella and kneeling  He takes Diclofenac as needed  HPI    Review of Systems  ROS:    See HPI for musculoskeletal review  All other systems reviewed are negative     History:  History reviewed  No pertinent past medical history    Past Surgical History:   Procedure Laterality Date    KIDNEY STONE SURGERY      VARICOCELE EXCISION       Social History   Social History     Substance and Sexual Activity   Alcohol Use Yes    Frequency: 2-4 times a month    Comment: ocassionally      Social History     Substance and Sexual Activity   Drug Use Never     Social History     Tobacco Use   Smoking Status Former Smoker    Start date: 2013   Smokeless Tobacco Current User    Types: Chew     Family History:   Family History   Problem Relation Age of Onset    Diabetes Mother     No Known Problems Father        Current Outpatient Medications on File Prior to Visit   Medication Sig Dispense Refill    diclofenac (VOLTAREN) 75 mg EC tablet Take 1 tablet (75 mg total) by mouth 2 (two) times a day PRN for pain 60 tablet 1    ergocalciferol (VITAMIN D2) 50,000 units Take 50,000 Units by mouth      levothyroxine 175 mcg tablet Take 175 mcg by mouth daily      Multiple Vitamin (MULTIVITAMIN) tablet Take 1 tablet by mouth daily      Omega-3 Fatty Acids (FISH OIL OMEGA-3 PO) Take by mouth       No current facility-administered medications on file prior to visit  Allergies   Allergen Reactions    Meloxicam Dermatitis        Objective     /73   Pulse 58   Ht 5' 11" (1 803 m)   Wt 96 2 kg (212 lb)   BMI 29 57 kg/m²      PE:  AAOx 3  WDWN  Hearing intact, no drainage from eyes  no audible wheezing  no abdominal distension  LE compartments soft, skin intact    Ortho Exam:  right Knee:   No erythema  no swelling  no effusion  no warmth  Good quadriceps tone  Pain on the patella  AROM: full +patellofemoral crepitus with PROM     Stable to varus/valgus stress    Large joint arthrocentesis: R knee  Date/Time: 11/20/2019 11:07 AM  Consent given by: patient  Site marked: site marked  Timeout: Immediately prior to procedure a time out was called to verify the correct patient, procedure, equipment, support staff and site/side marked as required   Supporting Documentation  Indications: pain   Procedure Details  Location: knee - R knee  Preparation: Patient was prepped and draped in the usual sterile fashion  Needle size: 22 G  Ultrasound guidance: no  Approach: anterolateral  Medications administered: 48 mg hylan 48 MG/6ML    Patient tolerance: patient tolerated the procedure well with no immediate complications  Dressing:  Sterile dressing applied        Imaging Studies: None reviewed today          Scribe Attestation    I,:   Jose Morris am acting as a scribe while in the presence of the attending physician :        I,:   Zahraa Huffman DO personally performed the services described in this documentation    as scribed in my presence :

## 2019-12-04 ENCOUNTER — HOSPITAL ENCOUNTER (OUTPATIENT)
Dept: RADIOLOGY | Facility: HOSPITAL | Age: 49
Discharge: HOME | End: 2019-12-04
Attending: ORTHOPAEDIC SURGERY
Payer: OTHER GOVERNMENT

## 2019-12-04 ENCOUNTER — HOSPITAL ENCOUNTER (OUTPATIENT)
Dept: MRI IMAGING | Facility: HOSPITAL | Age: 49
Discharge: HOME | End: 2019-12-04
Attending: ORTHOPAEDIC SURGERY
Payer: OTHER GOVERNMENT

## 2019-12-04 DIAGNOSIS — G89.29 CHRONIC RIGHT SHOULDER PAIN: ICD-10-CM

## 2019-12-04 DIAGNOSIS — M25.511 CHRONIC RIGHT SHOULDER PAIN: ICD-10-CM

## 2019-12-04 PROCEDURE — 77002 NEEDLE LOCALIZATION BY XRAY: CPT

## 2019-12-04 PROCEDURE — 73222 MRI JOINT UPR EXTREM W/DYE: CPT

## 2019-12-04 PROCEDURE — A9585 GADOBUTROL INJECTION: HCPCS | Performed by: ORTHOPAEDIC SURGERY

## 2019-12-04 PROCEDURE — 23350 INJECTION FOR SHOULDER X-RAY: CPT

## 2019-12-04 RX ORDER — LIDOCAINE HYDROCHLORIDE 10 MG/ML
7 INJECTION, SOLUTION EPIDURAL; INFILTRATION; INTRACAUDAL; PERINEURAL ONCE
Status: DISCONTINUED | OUTPATIENT
Start: 2019-12-04 | End: 2019-12-05 | Stop reason: HOSPADM

## 2019-12-04 RX ADMIN — IOHEXOL 3 ML: 300 INJECTION, SOLUTION INTRAVENOUS at 10:20

## 2019-12-04 RX ADMIN — GADOBUTROL 12 ML: 604.72 INJECTION INTRAVENOUS at 10:20

## 2019-12-04 RX ADMIN — GADOBUTROL 2 ML: 604.72 INJECTION INTRAVENOUS at 11:21

## 2019-12-06 ENCOUNTER — OFFICE VISIT (OUTPATIENT)
Dept: OBGYN CLINIC | Facility: MEDICAL CENTER | Age: 49
End: 2019-12-06
Payer: OTHER GOVERNMENT

## 2019-12-06 VITALS
HEART RATE: 57 BPM | BODY MASS INDEX: 29.68 KG/M2 | WEIGHT: 212 LBS | SYSTOLIC BLOOD PRESSURE: 120 MMHG | HEIGHT: 71 IN | DIASTOLIC BLOOD PRESSURE: 78 MMHG

## 2019-12-06 DIAGNOSIS — G89.29 CHRONIC RIGHT SHOULDER PAIN: ICD-10-CM

## 2019-12-06 DIAGNOSIS — M25.511 CHRONIC RIGHT SHOULDER PAIN: ICD-10-CM

## 2019-12-06 DIAGNOSIS — S43.431A TEAR OF RIGHT GLENOID LABRUM, INITIAL ENCOUNTER: Primary | ICD-10-CM

## 2019-12-06 PROCEDURE — 99213 OFFICE O/P EST LOW 20 MIN: CPT | Performed by: ORTHOPAEDIC SURGERY

## 2019-12-06 PROCEDURE — 20610 DRAIN/INJ JOINT/BURSA W/O US: CPT | Performed by: ORTHOPAEDIC SURGERY

## 2019-12-06 RX ORDER — METHYLPREDNISOLONE ACETATE 40 MG/ML
2 INJECTION, SUSPENSION INTRA-ARTICULAR; INTRALESIONAL; INTRAMUSCULAR; SOFT TISSUE
Status: COMPLETED | OUTPATIENT
Start: 2019-12-06 | End: 2019-12-06

## 2019-12-06 RX ORDER — BUPIVACAINE HYDROCHLORIDE 2.5 MG/ML
4 INJECTION, SOLUTION INFILTRATION; PERINEURAL
Status: COMPLETED | OUTPATIENT
Start: 2019-12-06 | End: 2019-12-06

## 2019-12-06 RX ADMIN — METHYLPREDNISOLONE ACETATE 2 ML: 40 INJECTION, SUSPENSION INTRA-ARTICULAR; INTRALESIONAL; INTRAMUSCULAR; SOFT TISSUE at 08:31

## 2019-12-06 RX ADMIN — BUPIVACAINE HYDROCHLORIDE 4 ML: 2.5 INJECTION, SOLUTION INFILTRATION; PERINEURAL at 08:31

## 2019-12-06 NOTE — PROGRESS NOTES
Orthopaedic Surgery - Office Note  Travis Fam (51 y o  male)   : 1970   MRN: [id number]  Encounter Date: 2019    Chief Complaint   Patient presents with    Right Shoulder - Follow-up       Assessment / Plan  Right shoulder small superior glenoid labral tear    · Cortisone injection administered to right glenohumeral joint today in the office  Post-injection protocol was reviewed  · Start physical therapy  · Surgical intervention was discussed  We will proceed with conservative treatments  Return in about 6 weeks (around 2020)  History of Present Illness  Travis Fam is a 51 y o  male who presents to the office for follow up of right shoulder pain ongoing for a couple years  He is here today to review the results of his right shoulder MRI arthrogram  He continues to experience posterior right shoulder pain  His pain increases with end range forward elevation, bar hangs and pull-ups  He initially tried physical therapy without improvement  He takes ibuprofen to control his pain  Review of Systems  Pertinent items are noted in HPI  All other systems were reviewed and are negative  Physical Exam  /78   Pulse 57   Ht 5' 11" (1 803 m)   Wt 96 2 kg (212 lb)   BMI 29 57 kg/m²   Cons: Appears well  No apparent distress  Psych: Alert  Oriented x3  Mood and affect normal   Eyes: PERRLA, EOMI  Resp: Normal effort  No audible wheezing or stridor  CV: Palpable pulse  No discernable arrhythmia  No LE edema  Lymph:  No palpable cervical, axillary, or inguinal lymphadenopathy  Skin: Warm  No palpable masses  No visible lesions  Neuro: Normal muscle tone  Normal and symmetric DTR's  Right Shoulder Exam  Alignment / Posture:  Normal shoulder posture  Inspection:  No swelling  No edema  No erythema  No ecchymosis  Palpation:  Posterior glenohumeral tenderness  ROM:  Shoulder  degrees  Shoulder ER 70 degrees  Shoulder IR T8    Strength:  5/5 supraspinatus, infraspinatus, and subscapularis  5/5 biceps and triceps  Stability:  No objective shoulder instability  Tests: (+) Swain  (-) Neer  (-) Speed  (-) Empty can  Neurovascular:  Sensation intact in Ax/R/M/U nerve distributions  Palpable radial pulse  Studies Reviewed  I have personally reviewed pertinent films in PACS  MRI arthrogram of right shoulder - small superior glenoid labral tear    Large joint arthrocentesis: R glenohumeral  Date/Time: 12/6/2019 8:31 AM  Consent given by: patient  Site marked: site marked  Timeout: Immediately prior to procedure a time out was called to verify the correct patient, procedure, equipment, support staff and site/side marked as required   Supporting Documentation  Indications: pain   Procedure Details  Location: shoulder - R glenohumeral  Preparation: Patient was prepped and draped in the usual sterile fashion  Needle size: 22 G  Ultrasound guidance: no  Approach: anterior  Medications administered: 4 mL bupivacaine 0 25 %; 2 mL methylPREDNISolone acetate 40 mg/mL    Patient tolerance: patient tolerated the procedure well with no immediate complications  Dressing:  Sterile dressing applied             Medical, Surgical, Family, and Social History  The patient's medical history, family history, and social history, were reviewed and updated as appropriate  History reviewed  No pertinent past medical history      Past Surgical History:   Procedure Laterality Date    FL INJECTION RIGHT SHOULDER (ARTHROGRAM)  12/4/2019    KIDNEY STONE SURGERY      VARICOCELE EXCISION         Family History   Problem Relation Age of Onset    Diabetes Mother     No Known Problems Father        Social History     Occupational History    Not on file   Tobacco Use    Smoking status: Former Smoker     Start date: 2013    Smokeless tobacco: Current User     Types: Chew   Substance and Sexual Activity    Alcohol use: Yes     Frequency: 2-4 times a month     Comment: ocassionally     Drug use: Never    Sexual activity: Yes       Allergies   Allergen Reactions    Meloxicam Dermatitis         Current Outpatient Medications:     diclofenac (VOLTAREN) 75 mg EC tablet, Take 1 tablet (75 mg total) by mouth 2 (two) times a day PRN for pain, Disp: 60 tablet, Rfl: 1    ergocalciferol (VITAMIN D2) 50,000 units, Take 50,000 Units by mouth, Disp: , Rfl:     levothyroxine 175 mcg tablet, Take 175 mcg by mouth daily, Disp: , Rfl:     Multiple Vitamin (MULTIVITAMIN) tablet, Take 1 tablet by mouth daily, Disp: , Rfl:     Omega-3 Fatty Acids (FISH OIL OMEGA-3 PO), Take by mouth, Disp: , Rfl:       Herrenschmiede    I,:   [employer] am acting as a scribe while in the presence of the attending physician :        I,:   James Lagunas MD personally performed the services described in this documentation    as scribed in my presence :

## 2019-12-16 ENCOUNTER — APPOINTMENT (OUTPATIENT)
Dept: RADIOLOGY | Facility: MEDICAL CENTER | Age: 49
End: 2019-12-16
Payer: OTHER GOVERNMENT

## 2019-12-16 ENCOUNTER — OFFICE VISIT (OUTPATIENT)
Dept: OBGYN CLINIC | Facility: MEDICAL CENTER | Age: 49
End: 2019-12-16
Payer: OTHER GOVERNMENT

## 2019-12-16 VITALS
DIASTOLIC BLOOD PRESSURE: 76 MMHG | HEIGHT: 71 IN | SYSTOLIC BLOOD PRESSURE: 123 MMHG | HEART RATE: 58 BPM | WEIGHT: 208 LBS | BODY MASS INDEX: 29.12 KG/M2

## 2019-12-16 DIAGNOSIS — Z01.89 ENCOUNTER FOR UPPER EXTREMITY COMPARISON IMAGING STUDY: ICD-10-CM

## 2019-12-16 DIAGNOSIS — M25.531 PAIN IN RIGHT WRIST: ICD-10-CM

## 2019-12-16 DIAGNOSIS — M67.439 DORSAL WRIST GANGLION: ICD-10-CM

## 2019-12-16 DIAGNOSIS — S63.8X1D TEAR OF RIGHT SCAPHOLUNATE LIGAMENT, SUBSEQUENT ENCOUNTER: Primary | ICD-10-CM

## 2019-12-16 PROCEDURE — 73110 X-RAY EXAM OF WRIST: CPT

## 2019-12-16 PROCEDURE — 99214 OFFICE O/P EST MOD 30 MIN: CPT | Performed by: ORTHOPAEDIC SURGERY

## 2019-12-16 NOTE — PROGRESS NOTES
Chief Complaint     Right wrist pain       History of the Present Illness     Jaylin Oliveira is a 51 y o  male RHD who presents to the office today for a follow up regarding right dorsal wrist pain  Kb Salamanca was seen in the office in November where he elected to proceed with a right dorsal carpal ganglion cyst aspiration/CSI  He states that he performed activity modification for aprox  3 weeks following the procedure  He notes continued pain to the dorsal aspect of his right wrist though he had a significant amount of improvement immediately after the procedure for the 1st 3 weeks  The activity he  Does involves boxing and the impact causes some pain but also the after were ordered soreness is problematic  Denies numbness and tingling  History reviewed  No pertinent past medical history  Past Surgical History:   Procedure Laterality Date    FL INJECTION RIGHT SHOULDER (ARTHROGRAM)  12/4/2019    KIDNEY STONE SURGERY      VARICOCELE EXCISION         Allergies   Allergen Reactions    Meloxicam Dermatitis       Current Outpatient Medications on File Prior to Visit   Medication Sig Dispense Refill    diclofenac (VOLTAREN) 75 mg EC tablet Take 1 tablet (75 mg total) by mouth 2 (two) times a day PRN for pain 60 tablet 1    ergocalciferol (VITAMIN D2) 50,000 units Take 50,000 Units by mouth      levothyroxine 175 mcg tablet Take 175 mcg by mouth daily      Multiple Vitamin (MULTIVITAMIN) tablet Take 1 tablet by mouth daily      Omega-3 Fatty Acids (FISH OIL OMEGA-3 PO) Take by mouth       No current facility-administered medications on file prior to visit          Social History     Tobacco Use    Smoking status: Former Smoker     Start date: 2013    Smokeless tobacco: Current User     Types: Chew   Substance Use Topics    Alcohol use: Yes     Frequency: 2-4 times a month     Comment: ocassionally     Drug use: Never       Family History   Problem Relation Age of Onset    Diabetes Mother     No Known Problems Father        Review of Systems     As stated in the HPI  All other systems were reviewed and are negative  Physical Exam     /76   Pulse 58   Ht 5' 11" (1 803 m)   Wt 94 3 kg (208 lb)   BMI 29 01 kg/m²     GENERAL: This is a well-developed, well-nourished, age-appropriate patient in no acute distress  The patient is alert and oriented x3  Pleasant and cooperative  Eyes: Anicteric sclerae  Extraocular movements appear intact  HENT: Nares are patent with no drainage  Lungs: There is equal chest rise on inspection  Breathing is non-labored with no audible wheezing  Cardiovascular: No cyanosis  No upper extremity lymphadema  Skin: Skin is warm to touch  No obvious skin lesions or rashes other than described below  Neurologic: No ataxia  Psychiatric: Mood and affect are appropriate  Examination of the right upper extremity reveals no masses lesions or deformities  Dorsal ganglion is less prominent especially in wrist flexion  Negative Canas shift test   mild tenderness at the SL interval   5/5 Motor to the APB, FDI, FDP2, FDP5, EDC  Sensation intact to light touch in the median, radial, and ulnar nerve distribution  Data Review     Results Reviewed     None             Imaging:   three-view imaging of the contralateral wrist taken in the office and personally reviewed by me today shows no evidence of scapholunate ligament diastasis or arthritis    Assessment and Plan      Diagnoses and all orders for this visit:    Tear of right scapholunate ligament, subsequent encounter    Pain in right wrist  -     Ambulatory referral to Orthopedic Surgery    Dorsal wrist ganglion          56-year-old male with improved pain status post corticosteroid injection and aspiration for ganglion associated with SL diastasis and easy deformity  I discussed with him that chronic SL diastasis and easy deformity is a difficult issue to treat    There are many surgical interventions that have been developed to try and address this problem which may or may not make the x-ray look better, but it does not necessarily improve the patient's symptomatology or outcome  At best, I could result in a stiff unstable wrist though given his activity level, stiffness may be a bit concerning for him  In addition, I do not know if I could successfully alleviate his pain and he would bet be at a risk for reoperation down the line  I also discussed with him that we are unsure of the natural course of at chronic SL diastasis and whether or not this will develop into a SLAC wrist early or later and we have no prospective studies to determine when this will occur  Therefore, I have recommended nonoperative treatment for him right now  Discussed that we can do conservative management with bracing anti-inflammatory and icing whenever his pain swells up and occasional corticosteroid injections for symptomatic treatment  Surgical intervention would be reserved for salvage operations once arthritis develops in significant pain that is chronic  Discussed that this could take many years for this to develop and that he should see me whenever he has significant pain that is refractory to nonoperative management  Follow Up:   P r n      To Do Next Visit:    PROCEDURES PERFORMED:  Procedures  No Procedures performed today       Scribe Attestation    I,:   Leeanne Nava am acting as a scribe while in the presence of the attending physician :        I,:   Collette Day, MD personally performed the services described in this documentation    as scribed in my presence :

## 2019-12-19 ENCOUNTER — CONSULT (OUTPATIENT)
Dept: ENDOCRINOLOGY | Facility: CLINIC | Age: 49
End: 2019-12-19
Payer: OTHER GOVERNMENT

## 2019-12-19 VITALS
HEIGHT: 71 IN | DIASTOLIC BLOOD PRESSURE: 80 MMHG | BODY MASS INDEX: 29.12 KG/M2 | SYSTOLIC BLOOD PRESSURE: 110 MMHG | HEART RATE: 64 BPM | WEIGHT: 208 LBS

## 2019-12-19 DIAGNOSIS — E83.52 HYPERCALCEMIA: ICD-10-CM

## 2019-12-19 DIAGNOSIS — D35.1 PARATHYROID ADENOMA: ICD-10-CM

## 2019-12-19 DIAGNOSIS — E03.9 ACQUIRED HYPOTHYROIDISM: ICD-10-CM

## 2019-12-19 DIAGNOSIS — E55.9 HYPOVITAMINOSIS D: Primary | ICD-10-CM

## 2019-12-19 DIAGNOSIS — E21.0 PRIMARY HYPERPARATHYROIDISM (HCC): ICD-10-CM

## 2019-12-19 PROBLEM — G47.30 MILD SLEEP APNEA: Status: ACTIVE | Noted: 2019-12-19

## 2019-12-19 PROCEDURE — 99204 OFFICE O/P NEW MOD 45 MIN: CPT | Performed by: INTERNAL MEDICINE

## 2019-12-19 RX ORDER — LEVOTHYROXINE SODIUM 0.2 MG/1
200 TABLET ORAL
COMMUNITY

## 2019-12-19 NOTE — PROGRESS NOTES
Chief Complaint   Patient presents with    Hypothyroidism    Hyperparathyroidism     with adenoma      Referring Provider  Isael Quiñones Md  [address]  [facility], [address]     History of Present Illness:   Clara Ward is a 51 y o  male with hypothyroidism seen to establish care  He moved to the area from [place] for his wife's Podiatry residency  He has two endocrine issues today hypothyroidism and primary hyperparathyroidism  Mr Bennie Cisneros brings records from the [facility], selected items will be scanned pertaining to his endocrine complaints  Hypothyroidism was initially diagnosed in Spring 2014, based on labs  Initially, he was prescribed levothyroxine at low doses and then titrated up  One week ago, his dose was increased from Levothyroxine 175mcg daily to 200mcg daily  He states the TSH was around 4 prior to the change  He takes this daily, in the morning, and separate from other medications  He does not miss doses  He currently reports fatigue and sensitivity to cold  While he has noted increased ry skin, he denies constipation or weight changes  His wife feels his mood is more "irritable" recently  Mr Bennie Cisneros denies changes in neck, changes in voice, or dysphagia  There is no known family hx of thyroid disease or calcium diseases  The PMHx includes mild sleep apnea, for which he uses a dental device and not CPAP  There is also a history Hypercalcemia due to a suspected left parathyroid adenoma  He has a hx of renal stones, and needed surgical removal of one stone [id number]  However the calcium levels increased more recently  He had a work-up consistent with PHPT  Labs included:  5/2019 24h urine 566    Mr Bennie Cisneros was seen by ENT and will see Dr Lewis Bennett in January 2020, when he expects surgery will be scheduled  He also reports being treated for Vitamin D deficiency with ergocalciferol [id number] weekly x 8 weeks   Prior to this he was on 1000units of D daily, but was told it was not enough  He completed this course 3 weeks ago  Patient Active Problem List   Diagnosis    Acquired hypothyroidism    Other hemorrhoids    Primary osteoarthritis of both knees    Hypercalcemia    [facility] [address] [facility]    Parathyroid adenoma    Mild sleep apnea    Hypovitaminosis D      Past Medical History:   Diagnosis Date    Hyperparathyroidism ([facility] [address] [facility]     Hypothyroidism       Past Surgical History:   Procedure Laterality Date    FL INJECTION RIGHT SHOULDER (ARTHROGRAM)  12/4/2019    KIDNEY STONE SURGERY      VARICOCELE EXCISION        Family History   Problem Relation Age of Onset    Diabetes Mother     No Known Problems Father      Social History     Tobacco Use    Smoking status: Former Smoker     Start date: 2013    Smokeless tobacco: Current User     Types: Chew   Substance Use Topics    Alcohol use: Yes     Frequency: 2-4 times a month     Comment: ocassionally      Allergies   Allergen Reactions    Meloxicam Dermatitis         Current Outpatient Medications:     diclofenac (VOLTAREN) 75 mg EC tablet, Take 1 tablet (75 mg total) by mouth 2 (two) times a day PRN for pain, Disp: 60 tablet, Rfl: 1    levothyroxine 200 mcg tablet, Take 200 mcg by mouth daily, Disp: , Rfl:     Multiple Vitamin (MULTIVITAMIN) tablet, Take 1 tablet by mouth daily, Disp: , Rfl:     Omega-3 Fatty Acids (FISH OIL OMEGA-3 PO), Take by mouth, Disp: , Rfl:   Review of Systems   Constitutional: Positive for fatigue  Negative for chills and fever  HENT: Negative for trouble swallowing and voice change  Eyes: Negative for visual disturbance  Respiratory: Negative for cough, shortness of breath and wheezing  Cardiovascular: Negative for chest pain and palpitations  Gastrointestinal: Negative for constipation, diarrhea, nausea and vomiting  Musculoskeletal: Positive for arthralgias  Skin: Negative for rash  Neurological: Negative for tremors and numbness  Psychiatric/Behavioral: The patient is not nervous/anxious  All other systems reviewed and are negative  see also HPI    Physical Exam:  Body mass index is 29 01 kg/m²  /80   Pulse 64   Ht 5' 11" (1 803 m)   Wt 94 3 kg (208 lb)   BMI 29 01 kg/m²    Wt Readings from Last 3 Encounters:   12/19/19 94 3 kg (208 lb)   12/16/19 94 3 kg (208 lb)   12/06/19 96 2 kg (212 lb)       GEN: NAD  E/n/m nl facies, hearing intact bilat, tongue midline, lips nl  Eyes: no stare or proptosis, nl lids and conjunctiva, EOMI  Neck: trachea midline, thyroid NT to palpation, nl in size, no nodules or neck masses noted, no cervical LAD  CV; heart reg rate s1s2 nl, no m/r/g appreciated, no OBED  Resp: CTAB, good effort  Ab+BS  Neuro: no tremor, 2+ DTRs in BUE  MS: no c/c in digits, moves all 4 ext, nl muscle bulk, gait nl  Skin: warm and dry, no palmar erythema  Psych: nl mood and affect, no gross lapses in memory    DATA:  Labs:   to be scanned  iPTH 65  Ionized calcium 1 40  Total calcium 10    5/2019 24h urine calcium 566      Radiology  9/2019  CT with reference to suspected left parathyroid adenoma (1 6cm) at tracheoesophageal groove (ENT notes)    Impression:  1  Hypovitaminosis D    2  Acquired hypothyroidism    3  Parathyroid adenoma    4  Primary hyperparathyroidism ([facility] [address] )    5  Hypercalcemia           Plan:    Larisa Walsh was seen today for hypothyroidism and hyperparathyroidism  Diagnoses and all orders for this visit:    Hypovitaminosis D  -     Vitamin D 25 hydroxy; Future    Acquired hypothyroidism  -     Ambulatory referral to Endocrinology  -     TSH, 3rd generation Lab Collect; Future    Parathyroid adenoma  -     Ambulatory referral to Endocrinology  -     PTH, intact- Lab Collect; Future  -     Calcium- Lab Collect; Future  -     Albumin Lab Collect; Future  -     Vitamin D 25 hydroxy;  Future    Primary hyperparathyroidism ([address] )  -     Ambulatory referral to Endocrinology  -     PTH, intact- Lab Collect; Future  -     Calcium- Lab Collect; Future  -     Albumin Lab Collect; Future  -     Vitamin D 25 hydroxy; Future    Hypercalcemia  -     Ambulatory referral to Endocrinology      1  Acquired hypothyroidism: He is establishing care for this and will continue on levothyroxine 200mcg daily  Check TSH in Jan 2020      2  Parathyroid adenoma, Primary hyperparathyroidism ([facility] [address] ): Scheduled to see ENT  Per notes, was ordered for repeat labs, though Mr Ng Parent does not recall receiving orders  Will reorder iPTH, calcium, albumin, today    3  Vit D deficiency: s/p ergocalciferol load  Will likely need maintenance dose  Check 25 OH D  Discussed with the patient and all questioned fully answered  He will call me if any problems arise          Angela Bullard MD

## 2019-12-20 ENCOUNTER — APPOINTMENT (OUTPATIENT)
Dept: LAB | Facility: MEDICAL CENTER | Age: 49
End: 2019-12-20
Payer: OTHER GOVERNMENT

## 2019-12-20 DIAGNOSIS — E21.0 PRIMARY HYPERPARATHYROIDISM (HCC): ICD-10-CM

## 2019-12-20 DIAGNOSIS — D35.1 PARATHYROID ADENOMA: ICD-10-CM

## 2019-12-20 DIAGNOSIS — E55.9 HYPOVITAMINOSIS D: ICD-10-CM

## 2019-12-20 LAB
25(OH)D3 SERPL-MCNC: 40.9 NG/ML (ref 30–100)
ALBUMIN SERPL BCP-MCNC: 4.2 G/DL (ref 3.5–5)
ALBUMIN SERPL BCP-MCNC: 4.3 G/DL (ref 3.5–5)
CALCIUM ALBUM COR SERPL-MCNC: 10 MG/DL (ref 8.3–10.1)
CALCIUM SERPL-MCNC: 10.2 MG/DL (ref 8.3–10.1)
CALCIUM SERPL-MCNC: 10.2 MG/DL (ref 8.3–10.1)
PTH-INTACT SERPL-MCNC: 94.8 PG/ML (ref 18.4–80.1)

## 2019-12-20 PROCEDURE — 82310 ASSAY OF CALCIUM: CPT

## 2019-12-20 PROCEDURE — 82040 ASSAY OF SERUM ALBUMIN: CPT

## 2019-12-20 PROCEDURE — 36415 COLL VENOUS BLD VENIPUNCTURE: CPT

## 2019-12-20 PROCEDURE — 83970 ASSAY OF PARATHORMONE: CPT

## 2019-12-20 PROCEDURE — 82306 VITAMIN D 25 HYDROXY: CPT

## 2020-01-13 PROBLEM — Q17.0 PRE-AURICULAR SKIN TAG: Status: ACTIVE | Noted: 2020-01-13

## 2020-01-13 PROBLEM — Q17.0 PREAURICULAR SKIN TAG: Status: ACTIVE | Noted: 2020-01-13

## 2020-01-13 PROBLEM — E21.3 HYPERPARATHYROIDISM (HCC): Status: ACTIVE | Noted: 2020-01-13

## 2020-01-30 ENCOUNTER — APPOINTMENT (OUTPATIENT)
Dept: LAB | Facility: MEDICAL CENTER | Age: 50
End: 2020-01-30
Payer: OTHER GOVERNMENT

## 2020-01-30 DIAGNOSIS — E03.9 ACQUIRED HYPOTHYROIDISM: ICD-10-CM

## 2020-01-30 LAB — TSH SERPL DL<=0.05 MIU/L-ACNC: 0.49 UIU/ML (ref 0.36–3.74)

## 2020-01-30 PROCEDURE — 36415 COLL VENOUS BLD VENIPUNCTURE: CPT

## 2020-01-30 PROCEDURE — 84443 ASSAY THYROID STIM HORMONE: CPT

## 2020-02-21 NOTE — PRE-PROCEDURE INSTRUCTIONS
Pre-Surgery Instructions:   Medication Instructions    diclofenac (VOLTAREN) 75 mg EC tablet Instructed patient per Anesthesia Guidelines   levothyroxine 200 mcg tablet Instructed patient per Anesthesia Guidelines   Multiple Vitamin (MULTIVITAMIN) tablet Instructed patient per Anesthesia Guidelines   Omega-3 Fatty Acids (FISH OIL OMEGA-3 PO) Instructed patient per Anesthesia Guidelines  Pre op, medications and showering instructions reviewed  Patient getting hibiclens

## 2020-02-25 PROBLEM — Q18.2: Status: ACTIVE | Noted: 2020-02-25

## 2020-02-26 ENCOUNTER — APPOINTMENT (OUTPATIENT)
Dept: LAB | Facility: MEDICAL CENTER | Age: 50
End: 2020-02-26
Payer: OTHER GOVERNMENT

## 2020-02-26 DIAGNOSIS — E21.3 HYPERPARATHYROIDISM (HCC): ICD-10-CM

## 2020-02-26 DIAGNOSIS — Q17.0 PRE-AURICULAR SKIN TAG: ICD-10-CM

## 2020-02-26 LAB
ANION GAP SERPL CALCULATED.3IONS-SCNC: 5 MMOL/L (ref 4–13)
BASOPHILS # BLD AUTO: 0.04 THOUSANDS/ΜL (ref 0–0.1)
BASOPHILS NFR BLD AUTO: 1 % (ref 0–1)
BUN SERPL-MCNC: 23 MG/DL (ref 5–25)
CALCIUM SERPL-MCNC: 9.8 MG/DL (ref 8.3–10.1)
CHLORIDE SERPL-SCNC: 108 MMOL/L (ref 100–108)
CO2 SERPL-SCNC: 26 MMOL/L (ref 21–32)
CREAT SERPL-MCNC: 1.09 MG/DL (ref 0.6–1.3)
EOSINOPHIL # BLD AUTO: 0.25 THOUSAND/ΜL (ref 0–0.61)
EOSINOPHIL NFR BLD AUTO: 4 % (ref 0–6)
ERYTHROCYTE [DISTWIDTH] IN BLOOD BY AUTOMATED COUNT: 11.9 % (ref 11.6–15.1)
GFR SERPL CREATININE-BSD FRML MDRD: 80 ML/MIN/1.73SQ M
GLUCOSE P FAST SERPL-MCNC: 105 MG/DL (ref 65–99)
HCT VFR BLD AUTO: 44.9 % (ref 36.5–49.3)
HGB BLD-MCNC: 15 G/DL (ref 12–17)
IMM GRANULOCYTES # BLD AUTO: 0.02 THOUSAND/UL (ref 0–0.2)
IMM GRANULOCYTES NFR BLD AUTO: 0 % (ref 0–2)
LYMPHOCYTES # BLD AUTO: 1.89 THOUSANDS/ΜL (ref 0.6–4.47)
LYMPHOCYTES NFR BLD AUTO: 32 % (ref 14–44)
MCH RBC QN AUTO: 30.5 PG (ref 26.8–34.3)
MCHC RBC AUTO-ENTMCNC: 33.4 G/DL (ref 31.4–37.4)
MCV RBC AUTO: 91 FL (ref 82–98)
MONOCYTES # BLD AUTO: 0.62 THOUSAND/ΜL (ref 0.17–1.22)
MONOCYTES NFR BLD AUTO: 10 % (ref 4–12)
NEUTROPHILS # BLD AUTO: 3.14 THOUSANDS/ΜL (ref 1.85–7.62)
NEUTS SEG NFR BLD AUTO: 53 % (ref 43–75)
NRBC BLD AUTO-RTO: 0 /100 WBCS
PLATELET # BLD AUTO: 321 THOUSANDS/UL (ref 149–390)
PMV BLD AUTO: 9.5 FL (ref 8.9–12.7)
POTASSIUM SERPL-SCNC: 4.4 MMOL/L (ref 3.5–5.3)
PTH-INTACT SERPL-MCNC: 111.3 PG/ML (ref 18.4–80.1)
RBC # BLD AUTO: 4.92 MILLION/UL (ref 3.88–5.62)
SODIUM SERPL-SCNC: 139 MMOL/L (ref 136–145)
WBC # BLD AUTO: 5.96 THOUSAND/UL (ref 4.31–10.16)

## 2020-02-26 PROCEDURE — 80048 BASIC METABOLIC PNL TOTAL CA: CPT

## 2020-02-26 PROCEDURE — 85025 COMPLETE CBC W/AUTO DIFF WBC: CPT

## 2020-02-26 PROCEDURE — 36415 COLL VENOUS BLD VENIPUNCTURE: CPT

## 2020-02-26 PROCEDURE — 83970 ASSAY OF PARATHORMONE: CPT

## 2020-03-03 ENCOUNTER — ANESTHESIA EVENT (OUTPATIENT)
Dept: PERIOP | Facility: HOSPITAL | Age: 50
End: 2020-03-03
Payer: OTHER GOVERNMENT

## 2020-03-04 ENCOUNTER — ANESTHESIA (OUTPATIENT)
Dept: PERIOP | Facility: HOSPITAL | Age: 50
End: 2020-03-04
Payer: OTHER GOVERNMENT

## 2020-03-04 ENCOUNTER — HOSPITAL ENCOUNTER (OUTPATIENT)
Facility: HOSPITAL | Age: 50
Setting detail: OUTPATIENT SURGERY
Discharge: HOME | End: 2020-03-04
Attending: SPECIALIST | Admitting: SPECIALIST
Payer: OTHER GOVERNMENT

## 2020-03-04 VITALS
HEIGHT: 71 IN | DIASTOLIC BLOOD PRESSURE: 73 MMHG | HEART RATE: 84 BPM | RESPIRATION RATE: 18 BRPM | TEMPERATURE: 97.1 F | BODY MASS INDEX: 29.12 KG/M2 | OXYGEN SATURATION: 98 % | WEIGHT: 208 LBS | SYSTOLIC BLOOD PRESSURE: 128 MMHG

## 2020-03-04 DIAGNOSIS — E21.3 HYPERPARATHYROIDISM (HCC): Primary | ICD-10-CM

## 2020-03-04 DIAGNOSIS — Q17.0 PRE-AURICULAR SKIN TAG: ICD-10-CM

## 2020-03-04 LAB
PTH-INTACT SERPL-MCNC: 107.5 PG/ML (ref 8.2–83.5)
PTH-INTACT SERPL-MCNC: 47.3 PG/ML (ref 8.2–83.5)

## 2020-03-04 PROCEDURE — 88331 PATH CONSLTJ SURG 1 BLK 1SPC: CPT | Performed by: PATHOLOGY

## 2020-03-04 PROCEDURE — 88305 TISSUE EXAM BY PATHOLOGIST: CPT | Performed by: PATHOLOGY

## 2020-03-04 PROCEDURE — 60500 EXPLORE PARATHYROID GLANDS: CPT | Performed by: SPECIALIST

## 2020-03-04 PROCEDURE — 42810 EXCISION OF NECK CYST: CPT | Performed by: SPECIALIST

## 2020-03-04 PROCEDURE — 83970 ASSAY OF PARATHORMONE: CPT | Performed by: SPECIALIST

## 2020-03-04 RX ORDER — PROPOFOL 10 MG/ML
INJECTION, EMULSION INTRAVENOUS AS NEEDED
Status: DISCONTINUED | OUTPATIENT
Start: 2020-03-04 | End: 2020-03-04 | Stop reason: SURG

## 2020-03-04 RX ORDER — ACETAMINOPHEN 325 MG/1
TABLET ORAL
Qty: 30 TABLET | Refills: 0
Start: 2020-03-04

## 2020-03-04 RX ORDER — HYDROMORPHONE HCL/PF 1 MG/ML
0.5 SYRINGE (ML) INJECTION
Status: DISCONTINUED | OUTPATIENT
Start: 2020-03-04 | End: 2020-03-04 | Stop reason: HOSPADM

## 2020-03-04 RX ORDER — SODIUM CHLORIDE, SODIUM LACTATE, POTASSIUM CHLORIDE, CALCIUM CHLORIDE 600; 310; 30; 20 MG/100ML; MG/100ML; MG/100ML; MG/100ML
50 INJECTION, SOLUTION INTRAVENOUS CONTINUOUS
Status: DISCONTINUED | OUTPATIENT
Start: 2020-03-04 | End: 2020-03-04 | Stop reason: HOSPADM

## 2020-03-04 RX ORDER — NEOSTIGMINE METHYLSULFATE 1 MG/ML
INJECTION INTRAVENOUS AS NEEDED
Status: DISCONTINUED | OUTPATIENT
Start: 2020-03-04 | End: 2020-03-04 | Stop reason: SURG

## 2020-03-04 RX ORDER — ACETAMINOPHEN 325 MG/1
650 TABLET ORAL EVERY 6 HOURS PRN
Status: DISCONTINUED | OUTPATIENT
Start: 2020-03-04 | End: 2020-03-04 | Stop reason: HOSPADM

## 2020-03-04 RX ORDER — FENTANYL CITRATE/PF 50 MCG/ML
50 SYRINGE (ML) INJECTION
Status: DISCONTINUED | OUTPATIENT
Start: 2020-03-04 | End: 2020-03-04 | Stop reason: HOSPADM

## 2020-03-04 RX ORDER — FENTANYL CITRATE 50 UG/ML
INJECTION, SOLUTION INTRAMUSCULAR; INTRAVENOUS AS NEEDED
Status: DISCONTINUED | OUTPATIENT
Start: 2020-03-04 | End: 2020-03-04 | Stop reason: SURG

## 2020-03-04 RX ORDER — MAGNESIUM HYDROXIDE 1200 MG/15ML
LIQUID ORAL AS NEEDED
Status: DISCONTINUED | OUTPATIENT
Start: 2020-03-04 | End: 2020-03-04 | Stop reason: HOSPADM

## 2020-03-04 RX ORDER — LIDOCAINE HYDROCHLORIDE AND EPINEPHRINE 10; 10 MG/ML; UG/ML
INJECTION, SOLUTION INFILTRATION; PERINEURAL AS NEEDED
Status: DISCONTINUED | OUTPATIENT
Start: 2020-03-04 | End: 2020-03-04 | Stop reason: HOSPADM

## 2020-03-04 RX ORDER — IBUPROFEN 400 MG/1
400 TABLET ORAL EVERY 6 HOURS PRN
Status: DISCONTINUED | OUTPATIENT
Start: 2020-03-04 | End: 2020-03-04 | Stop reason: HOSPADM

## 2020-03-04 RX ORDER — LIDOCAINE HYDROCHLORIDE 10 MG/ML
INJECTION, SOLUTION EPIDURAL; INFILTRATION; INTRACAUDAL; PERINEURAL AS NEEDED
Status: DISCONTINUED | OUTPATIENT
Start: 2020-03-04 | End: 2020-03-04 | Stop reason: SURG

## 2020-03-04 RX ORDER — HYDROMORPHONE HCL/PF 1 MG/ML
SYRINGE (ML) INJECTION AS NEEDED
Status: DISCONTINUED | OUTPATIENT
Start: 2020-03-04 | End: 2020-03-04 | Stop reason: SURG

## 2020-03-04 RX ORDER — DEXAMETHASONE SODIUM PHOSPHATE 10 MG/ML
INJECTION, SOLUTION INTRAMUSCULAR; INTRAVENOUS AS NEEDED
Status: DISCONTINUED | OUTPATIENT
Start: 2020-03-04 | End: 2020-03-04 | Stop reason: SURG

## 2020-03-04 RX ORDER — ROCURONIUM BROMIDE 10 MG/ML
INJECTION, SOLUTION INTRAVENOUS AS NEEDED
Status: DISCONTINUED | OUTPATIENT
Start: 2020-03-04 | End: 2020-03-04 | Stop reason: SURG

## 2020-03-04 RX ORDER — ONDANSETRON 2 MG/ML
4 INJECTION INTRAMUSCULAR; INTRAVENOUS ONCE AS NEEDED
Status: DISCONTINUED | OUTPATIENT
Start: 2020-03-04 | End: 2020-03-04 | Stop reason: HOSPADM

## 2020-03-04 RX ORDER — OXYCODONE HYDROCHLORIDE 5 MG/1
5 TABLET ORAL EVERY 6 HOURS PRN
Qty: 8 TABLET | Refills: 0 | Status: SHIPPED | OUTPATIENT
Start: 2020-03-04 | End: 2020-03-04 | Stop reason: SDUPTHER

## 2020-03-04 RX ORDER — IBUPROFEN 200 MG
400 TABLET ORAL EVERY 6 HOURS PRN
Qty: 120 TABLET | Refills: 0
Start: 2020-03-04 | End: 2020-07-24 | Stop reason: HOSPADM

## 2020-03-04 RX ORDER — B-COMPLEX WITH VITAMIN C
2 TABLET ORAL 3 TIMES DAILY
Qty: 180 TABLET | Refills: 0 | Status: SHIPPED | OUTPATIENT
Start: 2020-03-04 | End: 2020-07-21

## 2020-03-04 RX ORDER — OXYCODONE HYDROCHLORIDE 5 MG/1
5 TABLET ORAL EVERY 4 HOURS PRN
Status: DISCONTINUED | OUTPATIENT
Start: 2020-03-04 | End: 2020-03-04 | Stop reason: HOSPADM

## 2020-03-04 RX ORDER — MIDAZOLAM HYDROCHLORIDE 2 MG/2ML
INJECTION, SOLUTION INTRAMUSCULAR; INTRAVENOUS AS NEEDED
Status: DISCONTINUED | OUTPATIENT
Start: 2020-03-04 | End: 2020-03-04 | Stop reason: SURG

## 2020-03-04 RX ORDER — OXYCODONE HYDROCHLORIDE 5 MG/1
5 TABLET ORAL EVERY 6 HOURS PRN
Qty: 8 TABLET | Refills: 0 | Status: SHIPPED | OUTPATIENT
Start: 2020-03-04 | End: 2020-03-14

## 2020-03-04 RX ORDER — GLYCOPYRROLATE 0.2 MG/ML
INJECTION INTRAMUSCULAR; INTRAVENOUS AS NEEDED
Status: DISCONTINUED | OUTPATIENT
Start: 2020-03-04 | End: 2020-03-04 | Stop reason: SURG

## 2020-03-04 RX ORDER — SODIUM CHLORIDE, SODIUM LACTATE, POTASSIUM CHLORIDE, CALCIUM CHLORIDE 600; 310; 30; 20 MG/100ML; MG/100ML; MG/100ML; MG/100ML
INJECTION, SOLUTION INTRAVENOUS CONTINUOUS PRN
Status: DISCONTINUED | OUTPATIENT
Start: 2020-03-04 | End: 2020-03-04 | Stop reason: SURG

## 2020-03-04 RX ORDER — B-COMPLEX WITH VITAMIN C
2 TABLET ORAL ONCE
Status: COMPLETED | OUTPATIENT
Start: 2020-03-04 | End: 2020-03-04

## 2020-03-04 RX ORDER — ONDANSETRON 2 MG/ML
INJECTION INTRAMUSCULAR; INTRAVENOUS AS NEEDED
Status: DISCONTINUED | OUTPATIENT
Start: 2020-03-04 | End: 2020-03-04 | Stop reason: SURG

## 2020-03-04 RX ADMIN — NEOSTIGMINE METHYLSULFATE 3 MG: 1 INJECTION INTRAVENOUS at 12:18

## 2020-03-04 RX ADMIN — FENTANYL CITRATE 50 MCG: 50 INJECTION, SOLUTION INTRAMUSCULAR; INTRAVENOUS at 12:58

## 2020-03-04 RX ADMIN — OXYCODONE HYDROCHLORIDE 5 MG: 5 TABLET ORAL at 14:08

## 2020-03-04 RX ADMIN — OYSTER SHELL CALCIUM WITH VITAMIN D 2 TABLET: 500; 200 TABLET, FILM COATED ORAL at 14:07

## 2020-03-04 RX ADMIN — FENTANYL CITRATE 50 MCG: 50 INJECTION, SOLUTION INTRAMUSCULAR; INTRAVENOUS at 13:32

## 2020-03-04 RX ADMIN — DEXAMETHASONE SODIUM PHOSPHATE 4 MG: 10 INJECTION, SOLUTION INTRAMUSCULAR; INTRAVENOUS at 10:40

## 2020-03-04 RX ADMIN — ACETAMINOPHEN 650 MG: 325 TABLET, FILM COATED ORAL at 14:08

## 2020-03-04 RX ADMIN — MIDAZOLAM HYDROCHLORIDE 2 MG: 1 INJECTION, SOLUTION INTRAMUSCULAR; INTRAVENOUS at 10:17

## 2020-03-04 RX ADMIN — GLYCOPYRROLATE 0.4 MG: 0.2 INJECTION, SOLUTION INTRAMUSCULAR; INTRAVENOUS at 12:18

## 2020-03-04 RX ADMIN — LIDOCAINE HYDROCHLORIDE 50 MG: 10 INJECTION, SOLUTION EPIDURAL; INFILTRATION; INTRACAUDAL; PERINEURAL at 10:28

## 2020-03-04 RX ADMIN — SODIUM CHLORIDE, SODIUM LACTATE, POTASSIUM CHLORIDE, AND CALCIUM CHLORIDE: .6; .31; .03; .02 INJECTION, SOLUTION INTRAVENOUS at 10:20

## 2020-03-04 RX ADMIN — HYDROMORPHONE HYDROCHLORIDE 0.5 MG: 1 INJECTION, SOLUTION INTRAMUSCULAR; INTRAVENOUS; SUBCUTANEOUS at 10:55

## 2020-03-04 RX ADMIN — PROPOFOL 200 MG: 10 INJECTION, EMULSION INTRAVENOUS at 10:28

## 2020-03-04 RX ADMIN — ONDANSETRON 4 MG: 2 INJECTION INTRAMUSCULAR; INTRAVENOUS at 10:40

## 2020-03-04 RX ADMIN — FENTANYL CITRATE 50 MCG: 50 INJECTION INTRAMUSCULAR; INTRAVENOUS at 10:28

## 2020-03-04 RX ADMIN — ROCURONIUM BROMIDE 40 MG: 10 SOLUTION INTRAVENOUS at 10:29

## 2020-03-04 RX ADMIN — FENTANYL CITRATE 50 MCG: 50 INJECTION INTRAMUSCULAR; INTRAVENOUS at 10:42

## 2020-03-04 NOTE — DISCHARGE INSTRUCTIONS
Parathyroidectomy   WHAT YOU NEED TO KNOW:   A parathyroidectomy is surgery to remove part or all of your parathyroid glands  The parathyroid is made of 4 small glands that usually sit near the thyroid gland  The parathyroid glands make a hormone that controls the amount of calcium in your blood  DISCHARGE INSTRUCTIONS:   Medicines: You may need any of the following:  · NSAIDs  may decrease swelling and pain  This medicine can be bought with or without a doctor's order  This medicine can cause stomach bleeding or kidney problems in certain people  If you take blood thinner medicine, always ask your healthcare provider if NSAIDs are safe for you  Always read the medicine label and follow the directions on it before using this medicine  · Acetaminophen  decreases pain  It is available without a doctor's order  Ask how much to take and how often to take it  Follow directions  Acetaminophen can cause liver damage if not taken correctly  · Take your medicine as directed  Contact your healthcare provider if you think your medicine is not helping or if you have side effects  Tell him or her if you are allergic to any medicine  Keep a list of the medicines, vitamins, and herbs you take  Include the amounts, and when and why you take them  Bring the list or the pill bottles to follow-up visits  Carry your medicine list with you in case of an emergency  Follow up with your healthcare provider or surgeon as directed: You will need to return to have tests, your incision checked, and your drain or stitches removed  You may be referred to an endocrinologist  Write down your questions so you remember to ask them during your visits  Wound care:  Care for your wound as directed  You may need to carefully wash the wound with soap and water  Dry the area and put on new, clean bandages as directed  Change your bandages when they get wet or dirty  Check your drain when you change the bandages   Do not pull the drain out  Ask for more information about how to care for your drain  Rest as needed:  Slowly start to do more each day  Return to your daily activities as directed  Take supplements as directed: You may need to take calcium medicine to keep your blood calcium level normal  It may also help prevent and treat bone loss  Your healthcare provider may also tell you to take vitamin D to help your body absorb the calcium  Contact your healthcare provider or surgeon if:   · You have a fever  · Your wound is red, swollen, or draining pus  · You have pain in your neck area that does not go away, or gets worse even after you take your pain medicine  · You have chills, a cough, or feel weak and achy  · You have nausea or are vomiting  · You have questions or concerns about your condition or care  Seek care immediately or call 911 if:   · You cough up blood  · You feel lightheaded, short of breath, and have chest pain  · Your arm or leg feels warm, tender, and painful  It may look swollen and red  · You have trouble swallowing or talking, or you lose your voice  · You feel anxious, frightened, and uneasy  · You have the following symptoms of low blood calcium:     ¨ Confusion    ¨ Fatigue    ¨ Muscle spasms or muscle tightening    ¨ Numbness or tingling around your face, hands, or feet    ¨ A seizure  © 2017 [facility] Information is for End User's use only and may not be sold, redistributed or otherwise used for commercial purposes  All illustrations and images included in CareNotes® are the copyrighted property of A D A M [employer] Inc  or Jaden Leos  The above information is an  only  It is not intended as medical advice for individual conditions or treatments  Talk to your doctor, nurse or pharmacist before following any medical regimen to see if it is safe and effective for you      Call Dr Vinicio Moss with any questions or concerns: office ; mobile  (urgent issues)

## 2020-03-04 NOTE — H&P
H&P Exam - ENT   Paige Castillo 53 y o  male MRN: [id number]  Unit/Bed#: OR POOL Encounter: [id number]    Assessment/Plan     Assessment:  Hyperparathyroidism  Hypercalcemia  Congenital pre-auricular remnant, right pre-auricular area  Plan:  Left inferior parathyroidectomy, possible four gland exploration, excision right congenital remnant  History of Present Illness   HPI:  Paige Castillo is a 53 y o  male who presents for scheduled surgery  Review of Systems  Constitutional: Negative for chills, fever and unexpected weight change  HENT: Negative for congestion, ear discharge, ear pain, facial swelling, hearing loss, nosebleeds, postnasal drip, sinus pain, sore throat, tinnitus, trouble swallowing and voice change  Eyes: Negative for itching and visual disturbance  Respiratory: Negative for cough and shortness of breath  Cardiovascular: Negative for chest pain  Gastrointestinal: Negative for abdominal pain, constipation, diarrhea and vomiting  Endocrine: Negative for cold intolerance and heat intolerance  Neurological: Negative for dizziness and syncope           Historical Information   Past Medical History:   Diagnosis Date    Hyperparathyroidism ([address] )     Hypothyroidism     Kidney stone     Sleep apnea     + Mild Sleep Apne-Uses Dental Device     Past Surgical History:   Procedure Laterality Date    FL INJECTION RIGHT SHOULDER (ARTHROGRAM)  12/4/2019    KIDNEY STONE SURGERY      VARICOCELE EXCISION      WISDOM TOOTH EXTRACTION       Social History   Social History     Substance and Sexual Activity   Alcohol Use Yes    Frequency: 2-4 times a month    Drinks per session: 1 or 2    Binge frequency: Never    Comment: Socially     Social History     Substance and Sexual Activity   Drug Use Never     Social History     Tobacco Use   Smoking Status Former Smoker    Types: Cigarettes    Start date: 2013   Smokeless Tobacco Former User    Types: Jimmie Rouave Quit date: 2019 E-Cigarette/Vaping    E-Cigarette Use Never User      E-Cigarette/Vaping Substances     Family History: non-contributory    Meds/Allergies   PTA meds:   Prior to Admission Medications   Prescriptions Last Dose Informant Patient Reported? Taking? Multiple Vitamin (MULTIVITAMIN) tablet Past Week at Unknown time Self Yes Yes   Sig: Take 1 tablet by mouth daily   Omega-3 Fatty Acids (FISH OIL OMEGA-3 PO) Past Week at Unknown time Self Yes Yes   Sig: Take by mouth   diclofenac (VOLTAREN) 75 mg EC tablet Past Month at Unknown time Self No Yes   Sig: Take 1 tablet (75 mg total) by mouth 2 (two) times a day PRN for pain   levothyroxine 200 mcg tablet 3/4/2020 at Unknown time  Yes Yes   Sig: Take 200 mcg by mouth daily in the early morning       Facility-Administered Medications: None     Allergies   Allergen Reactions    Meloxicam Dermatitis       Objective   Vitals: Blood pressure 131/81, pulse 76, temperature 97 8 °F (36 6 °C), temperature source Temporal, resp  rate 18, height 5' 11" (1 803 m), weight 94 3 kg (208 lb), SpO2 95 %  No intake or output data in the 24 hours ending 03/04/20 1008    Invasive Devices     Peripheral Intravenous Line            Peripheral IV 03/04/20 Right Antecubital less than 1 day                Physical Exam  Constitutional: Oriented to person, place, and time  Well-developed and well-nourished, no apparent distress, non-toxic appearance  Cooperative, able to hear and answer questions without difficulty  Voice: Normal voice quality  Head: Normocephalic, atraumatic  No scars, masses or lesions  Face: Symmetric, no edema, no sinus tenderness  RIght pre-auricular congenital remnant  Eyes: Vision grossly intact, extra-ocular movement intact  Right Ear: External ear normal     Left Ear: External ear normal     Nose: External nose well appearing  Oral cavity:  Mucosa moist, lips normal   Tongue mobile  Neck: Trachea midline  No masses or lesions   No palpable adenopathy  Pulmonary/Chest: Normal effort and rate  No respiratory distress  Clear to auscultation  Cardiac: Regular rate and rhythm  Musculoskeletal: Normal range of motion  Neurological: Cranial nerves 2-12 intact  Skin: Skin is warm and dry  Psychiatric: Normal mood and affect      Lab Results: Pre-Op  5   CBC: No results found for: WBC, HGB, HCT, MCV, PLT, ADJUSTEDWBC, MCH, MCHC, RDW, MPV, NRBC, CMP: No results found for: SODIUM, K, CL, CO2, ANIONGAP, BUN, CREATININE, GLUCOSE, CALCIUM, AST, ALT, ALKPHOS, PROT, BILITOT, EGFR, Coags: No results found for: PT, PTT, INR     Imaging: I have personally reviewed pertinent films in PACS  EKG, Pathology, and Other Studies:        Code Status: No Order  Advance Directive and Living Will:      Power of :    POLST:      None

## 2020-03-04 NOTE — ANESTHESIA POSTPROCEDURE EVALUATION
Post-Op Assessment Note    CV Status:  Stable  Pain Score: 0    Pain management: adequate     Mental Status:  Alert and awake   Hydration Status:  Euvolemic   PONV Controlled:  Controlled   Airway Patency:  Patent   Post Op Vitals Reviewed: Yes      Staff: CRNA           BP (P) 134/80 (03/04/20 1230)    Temp (P) 97 7 °F (36 5 °C) (03/04/20 1230)    Pulse (P) 71 (03/04/20 1230)   Resp (P) 16 (03/04/20 1230)    SpO2 (P) 100 % (03/04/20 1230)

## 2020-03-04 NOTE — OP NOTE
OPERATIVE REPORT  PATIENT NAME: Gisel Good    :  1970  MRN: [id number]  Pt Location: AN OR ROOM 03    SURGERY DATE: 3/4/2020    Surgeon(s) and Role:     * Everette Dinero MD - Primary       Marychuy Montanez DDS - Assistant    Preop Diagnosis:  Hyperparathyroidism [facility]) [E21 3]  Pre-auricular skin tag [Q17 0] (Congenital pre-auricular malformation), right     Post-Op Diagnosis Codes:     * Hyperparathyroidism ([address] ) [E21 3]     * Pre-auricular skin tag [Q17 0] (Congenital pre-auricular malformation) right    Procedure(s) ):  LEFT PARATHYROIDECTOMY   EXCISION CONGENITAL PRE-AURICULAR MALFORMATION, RIGHT    Specimen(s):  ID Type Source Tests Collected by Time Destination   1 : LEFT SUPERIOR PARATHYROID CONFRIRM HYPERCELLULAR Tissue Parathyroid TISSUE Noni Her MD 3/4/2020 1124    2 : LEFT INFERIOR PARATHYROID SUSPECT HYPERCELLULAR Tissue Parathyroid TISSUE EXAM Everette Dinero MD 3/4/2020 1125        Estimated Blood Loss:   Minimal    Drains:  NONE    Anesthesia Type:   General    Operative Indications:  53year old man with hyperparathyroidism, hypercalcemia, workup consistent with primary hyperparathyroidism due to likely parathyroid adenoma, localized to left side  As such, parathyroidectomy was indicated  Additionally, he has a congenital pre-auricular malformation, which he cuts repeatedly when shaving, and it also inteferes with wearing headphones for hearing protection  As such, excision was indicated  Operative Findings:  Enlarged left parathyroid gland, immediately adjacent to the left recurrent laryngeal nerve, confirmed as hypercellular parathyroid gland on frozen section evaluation  Possible second abnormal parathyroid gland left inferior, confirmed as lymph node on frozen section evaluation  Pre-op , after removal 47  Right congenital pre-auricular malformation, 8 mm, containing cartilage, removed       Complications:   None    Procedure and Technique:  Gisel Good was positively identified and transferred onto the operating table in the supine position  Appropriate monitoring devices were put in place, anesthesia was induced and the patient was intubated without difficulty  A shoulder roll was placed, and the patients neck and face was examined  An appropriate site for skin incision was demarcated 1cm below the cricoid cartilage  Before proceeding further, the timeout process was completed  Local anesthesia in the form of 1% lidocaine with 1/100,000 epinephrine was then injected to the marked site  The patients neck and face were then prepped and draped in the usual sterile fashion  Skin incision was then made at the marked site in a transverse fashion using a 15 blade  Dissection continued through subcutaneous tissues and platysma using the 15 blade  Dissection then continued vertically in midline in between strap musculature using DeBakey forceps and Bovie cautery  Strap muscles were then elevated off the underlying thyroid gland on the left side using Bovie cautery and blunt dissection  The strap muscles were then held in a retracted position using an [employer] retractor  Dissection then continued around the thyroid gland, immediately adjacent to the capsule of the gland using bipolar cautery  The gland was retracted medially, and dissection continued using mosquito forceps and bipolar cautery  With continued dissection a firm mass suggestive of a pathologic parathyroid gland was identified  As appearance was not conclusive, dissection continued superiorly along the posterior aspect of the gland, and a mass consistent with parathyroid adenoma was encountered  The abnormal gland was carefully dissected free using mosquito forceps and bipolar cautery, and sent to pathology for frozen and permanent section evaluation  The mass initially identified was also removed and sent   The surgical site was irrigated with saline which was suctioned free, and hemostasis was ensured using bipolar cautery  10 minutes after removal of the gland, a blood sample was obtained to assess PTH level  This result confirmed a return to normal range, and as such the surgical site was then closed in multiple layers  Strap muscles were re-approximated across midline using 3-0 Vicryl stitches in an interrupted fashion, and the same stitch was used in an interrupted fashion to reapproximate the plastysma and tissue in midline at the same plane  Skin was closed using a 4-0 Monocryl stitch in a running sub-cuticular fashion, followed by a Steristrip  Attention was directed to the right face  A separate time-out was taken before starting surgery at a separate site  An appropriate site for skin incision was demarcated in an elliptical fashion, measuring 1 5 in length and 6 cm in width around the base of the malformation  Local anesthesia was applied  Thereafter an elliptical skin incision was made using a 15 blade, which was also used to undermine and remove the entire malformation, with care taken to excise the entire portion of cartilage within the malformation  Hemostasis was accomplished using Bovie cautery  The surgical site was closed in multiple layers, using interrupted 4-0 Monocryl stitches and interrupted 5-0 Prolene stitches to close skin  Bacitracin was applied  Anesthesia was then reversed and drapes were removed  The patient was awakened, extubated and taken to the recovery room in stable condition  All counts were correct at the end of the case, and no complications were encountered       I was present for the entire procedure    Patient Disposition:  PACU  and extubated and stable    SIGNATURE: Logan Dunne MD  DATE: March 4, 2020  TIME: 12:35 PM

## 2020-03-04 NOTE — ANESTHESIA PREPROCEDURE EVALUATION
Review of Systems/Medical History  Patient summary reviewed  Chart reviewed  No history of anesthetic complications     Cardiovascular  Negative cardio ROS Exercise tolerance (METS): >4,     Pulmonary  Sleep apnea ,        GI/Hepatic  Negative GI/hepatic ROS          Kidney stones,        Endo/Other  History of thyroid disease , hypothyroidism, Parathyroid disease hyperparathyroidism,      GYN       Hematology  Negative hematology ROS      Musculoskeletal  Negative musculoskeletal ROS        Neurology  Negative neurology ROS      Psychology   Negative psychology ROS              Physical Exam    Airway    Mallampati score: I  TM Distance: >3 FB  Neck ROM: full     Dental   No notable dental hx     Cardiovascular  Comment: Negative ROS, Rate: normal,     Pulmonary  Breath sounds clear to auscultation,     Other Findings        Anesthesia Plan  ASA Score- 2     Anesthesia Type- general with ASA Monitors  Additional Monitors:   Airway Plan: ETT  Plan Factors-  Patient did not smoke on day of surgery  Induction- intravenous  Postoperative Plan- Plan for postoperative opioid use  Informed Consent- Anesthetic plan and risks discussed with patient  I personally reviewed this patient with the CRNA  Discussed and agreed on the Anesthesia Plan with the CRNA  Caren Day

## 2020-03-13 ENCOUNTER — OFFICE VISIT (OUTPATIENT)
Dept: OBGYN CLINIC | Facility: MEDICAL CENTER | Age: 50
End: 2020-03-13
Payer: OTHER GOVERNMENT

## 2020-03-13 VITALS
HEIGHT: 71 IN | WEIGHT: 207 LBS | BODY MASS INDEX: 28.98 KG/M2 | SYSTOLIC BLOOD PRESSURE: 123 MMHG | HEART RATE: 73 BPM | DIASTOLIC BLOOD PRESSURE: 85 MMHG

## 2020-03-13 DIAGNOSIS — M25.531 RIGHT WRIST PAIN: Primary | ICD-10-CM

## 2020-03-13 DIAGNOSIS — S63.501D COMPLETE TEAR OF WRIST LIGAMENT, RIGHT, SUBSEQUENT ENCOUNTER: ICD-10-CM

## 2020-03-13 PROCEDURE — 99213 OFFICE O/P EST LOW 20 MIN: CPT | Performed by: ORTHOPAEDIC SURGERY

## 2020-03-13 PROCEDURE — 20605 DRAIN/INJ JOINT/BURSA W/O US: CPT | Performed by: ORTHOPAEDIC SURGERY

## 2020-03-13 PROCEDURE — 1036F TOBACCO NON-USER: CPT | Performed by: ORTHOPAEDIC SURGERY

## 2020-03-13 PROCEDURE — 3008F BODY MASS INDEX DOCD: CPT | Performed by: ORTHOPAEDIC SURGERY

## 2020-03-13 RX ORDER — LIDOCAINE HYDROCHLORIDE 10 MG/ML
0.5 INJECTION, SOLUTION INFILTRATION; PERINEURAL
Status: COMPLETED | OUTPATIENT
Start: 2020-03-13 | End: 2020-03-13

## 2020-03-13 RX ORDER — BETAMETHASONE SODIUM PHOSPHATE AND BETAMETHASONE ACETATE 3; 3 MG/ML; MG/ML
6 INJECTION, SUSPENSION INTRA-ARTICULAR; INTRALESIONAL; INTRAMUSCULAR; SOFT TISSUE
Status: COMPLETED | OUTPATIENT
Start: 2020-03-13 | End: 2020-03-13

## 2020-03-13 RX ADMIN — BETAMETHASONE SODIUM PHOSPHATE AND BETAMETHASONE ACETATE 6 MG: 3; 3 INJECTION, SUSPENSION INTRA-ARTICULAR; INTRALESIONAL; INTRAMUSCULAR; SOFT TISSUE at 07:32

## 2020-03-13 RX ADMIN — LIDOCAINE HYDROCHLORIDE 0.5 ML: 10 INJECTION, SOLUTION INFILTRATION; PERINEURAL at 07:32

## 2020-04-27 ENCOUNTER — TELEPHONE (OUTPATIENT)
Dept: OBGYN CLINIC | Facility: CLINIC | Age: 50
End: 2020-04-27

## 2020-04-28 DIAGNOSIS — S63.501D COMPLETE TEAR OF WRIST LIGAMENT, RIGHT, SUBSEQUENT ENCOUNTER: Primary | ICD-10-CM

## 2020-05-13 ENCOUNTER — HOSPITAL ENCOUNTER (OUTPATIENT)
Dept: RADIOLOGY | Facility: HOSPITAL | Age: 50
Discharge: HOME | End: 2020-05-13
Attending: ORTHOPAEDIC SURGERY | Admitting: RADIOLOGY
Payer: OTHER GOVERNMENT

## 2020-05-13 ENCOUNTER — HOSPITAL ENCOUNTER (OUTPATIENT)
Dept: RADIOLOGY | Facility: HOSPITAL | Age: 50
Discharge: HOME | End: 2020-05-13
Attending: ORTHOPAEDIC SURGERY
Payer: OTHER GOVERNMENT

## 2020-05-13 ENCOUNTER — TRANSCRIBE ORDERS (OUTPATIENT)
Dept: RADIOLOGY | Facility: HOSPITAL | Age: 50
End: 2020-05-13

## 2020-05-13 DIAGNOSIS — S63.501D COMPLETE TEAR OF WRIST LIGAMENT, RIGHT, SUBSEQUENT ENCOUNTER: ICD-10-CM

## 2020-05-13 PROCEDURE — 25246 INJECTION FOR WRIST X-RAY: CPT

## 2020-05-13 PROCEDURE — 77002 NEEDLE LOCALIZATION BY XRAY: CPT

## 2020-05-13 PROCEDURE — 73222 MRI JOINT UPR EXTREM W/DYE: CPT

## 2020-05-13 PROCEDURE — A9585 GADOBUTROL INJECTION: HCPCS | Performed by: ORTHOPAEDIC SURGERY

## 2020-05-13 RX ORDER — SODIUM CHLORIDE 9 MG/ML
50 INJECTION INTRAVENOUS
Status: DISCONTINUED | OUTPATIENT
Start: 2020-05-13 | End: 2020-05-14 | Stop reason: HOSPADM

## 2020-05-13 RX ORDER — LIDOCAINE HYDROCHLORIDE 10 MG/ML
5 INJECTION, SOLUTION EPIDURAL; INFILTRATION; INTRACAUDAL; PERINEURAL
Status: DISCONTINUED | OUTPATIENT
Start: 2020-05-13 | End: 2020-05-14 | Stop reason: HOSPADM

## 2020-05-13 RX ADMIN — IOHEXOL 3 ML: 300 INJECTION, SOLUTION INTRAVENOUS at 13:50

## 2020-05-13 RX ADMIN — GADOBUTROL 2 ML: 604.72 INJECTION INTRAVENOUS at 13:55

## 2020-05-18 ENCOUNTER — APPOINTMENT (OUTPATIENT)
Dept: RADIOLOGY | Facility: MEDICAL CENTER | Age: 50
End: 2020-05-18
Payer: OTHER GOVERNMENT

## 2020-05-18 ENCOUNTER — OFFICE VISIT (OUTPATIENT)
Dept: OBGYN CLINIC | Facility: MEDICAL CENTER | Age: 50
End: 2020-05-18
Payer: OTHER GOVERNMENT

## 2020-05-18 VITALS
HEART RATE: 64 BPM | WEIGHT: 205 LBS | HEIGHT: 71 IN | BODY MASS INDEX: 28.7 KG/M2 | DIASTOLIC BLOOD PRESSURE: 77 MMHG | SYSTOLIC BLOOD PRESSURE: 129 MMHG

## 2020-05-18 DIAGNOSIS — M25.531 RIGHT WRIST PAIN: ICD-10-CM

## 2020-05-18 DIAGNOSIS — S63.8X1D TEAR OF RIGHT SCAPHOLUNATE LIGAMENT, SUBSEQUENT ENCOUNTER: Primary | ICD-10-CM

## 2020-05-18 PROCEDURE — 73100 X-RAY EXAM OF WRIST: CPT

## 2020-05-18 PROCEDURE — 3008F BODY MASS INDEX DOCD: CPT | Performed by: ORTHOPAEDIC SURGERY

## 2020-05-18 PROCEDURE — 1036F TOBACCO NON-USER: CPT | Performed by: ORTHOPAEDIC SURGERY

## 2020-05-18 PROCEDURE — 99214 OFFICE O/P EST MOD 30 MIN: CPT | Performed by: ORTHOPAEDIC SURGERY

## 2020-05-18 RX ORDER — MELATONIN
1000 DAILY
COMMUNITY

## 2020-06-09 ENCOUNTER — TELEPHONE (OUTPATIENT)
Dept: OBGYN CLINIC | Facility: CLINIC | Age: 50
End: 2020-06-09

## 2020-06-10 ENCOUNTER — TELEPHONE (OUTPATIENT)
Dept: ENDOCRINOLOGY | Facility: CLINIC | Age: 50
End: 2020-06-10

## 2020-06-17 NOTE — PROGRESS NOTES
Assessment/Plan     1  Primary osteoarthritis of both knees    2  Left knee pain, unspecified chronicity    3  Primary osteoarthritis of left knee    4  Primary osteoarthritis of right knee    5  Chronic pain of right knee    6  Patellofemoral arthritis      Orders Placed This Encounter   Procedures    XR knee 4+ vw left injury    XR knee 4+ vw right injury    MRI knee right  wo contrast    Injection procedure prior authorization     · Discussed with patient conservative treatments: CSI, Visco supplementation injections, physical therapy, bracing and medication   · Will be ordering MRI Right knee to r/o soft tissue pathology   · Patient declined CSI today   Prescribed patient Diclofenac prn pain, medications warnings were reviewed with patient  · Patient had a reaction to mobic with redness, no rx with motrin, advised patient to take benadryl and/or go to ER if needed  · May take Tylenol 1000 mg every 8 hours prn for pain, Do not exceed 3000 mg a day   · Will be ordering Synvisc one injection for the Right knee   · Patient declined brace   Return for Discuss MRI Right knee   I answered all of the patient's questions during the visit and provided education of the patient's condition during the visit  The patient verbalized understanding of the information given and agrees with the plan  This note was dictated using Stem software  It may contain errors including improperly dictated words  Please contact physician directly for any questions  History of Present Illness   Chief complaint:   Chief Complaint   Patient presents with    Left Knee - Pain    Right Knee - Pain       HPI: Paige Castillo is a 51 y o  male that c/o bilateral knee pain worse on the right  He was diagnosed  Patient states he was treated in the past at [facility] and  had multiple Synvisc One injections on the Right knee with relief, last injection was 2013  He also had Right knee CSI with no relief   Patient states he is generalized ache pain bilateral knee, worse on the right  He notes sharp pain at times  He does note instability in the right knee when going down steps  Pain is worse with kneeling, squatting, walking and going down steps  He is taking Motrin and Tylenol prn for pain  At its worse, he would take Tramadol 50 mg  He has tired Mobic in the past and states he had a reaction to the medication, he noted he turned red  No shortness of breath  He has not had any physical therapy or surgeries bilateral knee and no injections on the  Left knee  ROS:    See HPI for musculoskeletal review  All other systems reviewed are negative     Historical Information   No past medical history on file  No past surgical history on file  Social History   Social History     Substance and Sexual Activity   Alcohol Use Yes    Comment: ocassionally      Social History     Substance and Sexual Activity   Drug Use Never     Social History     Tobacco Use   Smoking Status Never Smoker   Smokeless Tobacco Never Used     Family History: No family history on file  Current Outpatient Medications on File Prior to Visit   Medication Sig Dispense Refill    ergocalciferol (VITAMIN D2) 50,000 units Take 50,000 Units by mouth      levothyroxine 175 mcg tablet Take 175 mcg by mouth daily      Multiple Vitamin (MULTIVITAMIN) tablet Take 1 tablet by mouth daily      Omega-3 Fatty Acids (FISH OIL OMEGA-3 PO) Take by mouth       No current facility-administered medications on file prior to visit        Allergies   Allergen Reactions    Meloxicam Dermatitis       Current Outpatient Medications on File Prior to Visit   Medication Sig Dispense Refill    ergocalciferol (VITAMIN D2) 50,000 units Take 50,000 Units by mouth      levothyroxine 175 mcg tablet Take 175 mcg by mouth daily      Multiple Vitamin (MULTIVITAMIN) tablet Take 1 tablet by mouth daily      Omega-3 Fatty Acids (FISH OIL OMEGA-3 PO) Take by mouth       No current facility-administered medications on file prior to visit  Objective   Vitals: Blood pressure 124/80, pulse 62, height 5' 11" (1 803 m), weight 99 8 kg (220 lb)  ,Body mass index is 30 68 kg/m²      PE:  AAOx 3  WDWN  Hearing intact, no drainage from eyes  Regular rate  no audible wheezing  no abdominal distension  LE compartments soft, skin intact    rightknee:    Appearance:  no swelling   No ecchymosis  no obvious joint deformity   No effusion  Palpation/Tenderness:  No TTP over medial joint line  No TTP over lateral joint line   No TTP over patella  No TTP over patellar tendon  No TTP over pes anserine bursa  Active Range of Motion:  AROM: full/ mild crepitus with ROM   Special Tests:  Medial Kemi's Test:  Positive  Lateral Kemi's Test:  Positive   Apley's compression test:  Negative  Lachman's Test: increased excursion with end point  Anterior  And Posterior Drawer Test:  Negative  Patellar grind:  Positive   Valgus Stress Test:  negative  Varus Stress Test:  negative     No ipsilateral hip pain with ROM    leftknee:    Appearance:  no swelling   No ecchymosis  no obvious joint deformity   No effusion  Palpation/Tenderness:  No TTP over medial joint line  No TTP over lateral joint line   No TTP over patella  No TTP over patellar tendon  No TTP over pes anserine bursa  Active Range of Motion:  AROM: full/ Mild crepitus with ROM   Special Tests:  Medial Kemi's Test:  Negative  Lateral Kemi's Test:  Positive   Apley's compression test:  Negative  Lachman's Test:  negative  Anterior and Posterior Drawer Test:  Negative  Patellar grind:  Negative  Valgus Stress Test:  negative  Varus Stress Test:  negative     No ipsilateral hip pain with ROM    bilateralLE:      LLE:  EHL/AT/GS/quads/hamstrings/iliopsoas 5/5, sensation grossly intact L4, L5, S1, palpable pedal pulse  RLE:  EHL/AT/GS/quads/hamstrings/iliopsoas 5/5, sensation grossly intact L4, L5, S1, palpable pedal pulse    Imaging Studies: I have personally reviewed pertinent films in PACS  rightknee: No acute osseous abnormality, mild DJD  Left knee: No acute osseous abnormality, mild DJD    Scribe Attestation    I,:   Joshua Matute am acting as a scribe while in the presence of the attending physician :        I,:   Sergio Gillespie, DO personally performed the services described in this documentation    as scribed in my presence : Mohs Histo Method Verbiage: Each section was then chromacoded and processed in the Mohs lab using the Mohs protocol and submitted for frozen section.

## 2020-06-22 ENCOUNTER — TELEMEDICINE (OUTPATIENT)
Dept: ENDOCRINOLOGY | Facility: CLINIC | Age: 50
End: 2020-06-22
Payer: OTHER GOVERNMENT

## 2020-06-22 DIAGNOSIS — E03.9 ACQUIRED HYPOTHYROIDISM: Primary | ICD-10-CM

## 2020-06-22 DIAGNOSIS — E21.0 PRIMARY HYPERPARATHYROIDISM (HCC): ICD-10-CM

## 2020-06-22 DIAGNOSIS — E55.9 HYPOVITAMINOSIS D: ICD-10-CM

## 2020-06-22 PROCEDURE — 99214 OFFICE O/P EST MOD 30 MIN: CPT | Performed by: INTERNAL MEDICINE

## 2020-06-22 PROCEDURE — 1036F TOBACCO NON-USER: CPT | Performed by: INTERNAL MEDICINE

## 2020-07-14 DIAGNOSIS — M19.131 SLAC (SCAPHOLUNATE ADVANCED COLLAPSE) OF WRIST, RIGHT: ICD-10-CM

## 2020-07-14 PROCEDURE — U0003 INFECTIOUS AGENT DETECTION BY NUCLEIC ACID (DNA OR RNA); SEVERE ACUTE RESPIRATORY SYNDROME CORONAVIRUS 2 (SARS-COV-2) (CORONAVIRUS DISEASE [COVID-19]), AMPLIFIED PROBE TECHNIQUE, MAKING USE OF HIGH THROUGHPUT TECHNOLOGIES AS DESCRIBED BY CMS-2020-01-R: HCPCS

## 2020-07-19 LAB
INPATIENT: NORMAL
SARS-COV-2 RNA SPEC QL NAA+PROBE: NOT DETECTED

## 2020-07-20 ENCOUNTER — OFFICE VISIT (OUTPATIENT)
Dept: OBGYN CLINIC | Facility: MEDICAL CENTER | Age: 50
End: 2020-07-20
Payer: OTHER GOVERNMENT

## 2020-07-20 VITALS
HEIGHT: 71 IN | BODY MASS INDEX: 29.4 KG/M2 | HEART RATE: 80 BPM | DIASTOLIC BLOOD PRESSURE: 80 MMHG | WEIGHT: 210 LBS | RESPIRATION RATE: 18 BRPM | SYSTOLIC BLOOD PRESSURE: 136 MMHG | TEMPERATURE: 99.1 F

## 2020-07-20 DIAGNOSIS — S63.8X1D TEAR OF RIGHT SCAPHOLUNATE LIGAMENT, SUBSEQUENT ENCOUNTER: Primary | ICD-10-CM

## 2020-07-20 PROCEDURE — 3008F BODY MASS INDEX DOCD: CPT | Performed by: ORTHOPAEDIC SURGERY

## 2020-07-20 PROCEDURE — 1036F TOBACCO NON-USER: CPT | Performed by: ORTHOPAEDIC SURGERY

## 2020-07-20 PROCEDURE — 99214 OFFICE O/P EST MOD 30 MIN: CPT | Performed by: ORTHOPAEDIC SURGERY

## 2020-07-20 RX ORDER — UBIDECARENONE 75 MG
CAPSULE ORAL DAILY
COMMUNITY

## 2020-07-20 NOTE — PROGRESS NOTES
Chief Complaint     Right wrist pain      History of Present Illness     Yamilka Velez is a 53 y o  male who presents with continued right wrist pain  Notes that he has persistent disability related to the right wrist pain  He is not able to perform the activities of his job to the fullest extent  He is currently still in active duty in the [place]  Patient notes that he has popping and clicking in the wrist   Denies any numbness or tingling  Patient is use the wrist brace in the past with some improvement but nothing persistent  Has tried injections to the wrist with no persistent benefit either  Past Medical History:   Diagnosis Date    Hyperparathyroidism ([address] )     Hypothyroidism     Kidney stone     Sleep apnea     + Mild Sleep Apne-Uses Dental Device       Past Surgical History:   Procedure Laterality Date    FACIAL/NECK BIOPSY Right 3/4/2020    Procedure: PREAURICULAR SKIN TAG EXCISION;  Surgeon: Pascual Kimble MD;  [facility];  Service: ENT    FL INJECTION RIGHT SHOULDER (ARTHROGRAM)  12/4/2019    FL INJECTION RIGHT WRIST (ARTHROGRAM)  5/13/2020    KIDNEY STONE SURGERY      NM EXPLORE PARATHYROID GLANDS Left 3/4/2020    Procedure: LEFT PARATHYROIDECTOMY WITH INTRAOP PTH;  Surgeon: Pascual Kimble MD;  [facility];  Service: ENT    VARICOCELE EXCISION      WISDOM TOOTH EXTRACTION         Allergies   Allergen Reactions    Meloxicam Dermatitis       Current Outpatient Medications on File Prior to Visit   Medication Sig Dispense Refill    acetaminophen (TYLENOL) 325 mg tablet 2, by mouth, every 6 hours as needed for mild to moderate pain   30 tablet 0    cholecalciferol (VITAMIN D3) 1,000 units tablet Take 1,000 Units by mouth daily      cyanocobalamin (VITAMIN B-12) 100 mcg tablet Take by mouth daily      diclofenac (VOLTAREN) 75 mg EC tablet Take 1 tablet (75 mg total) by mouth 2 (two) times a day PRN for pain 60 tablet 1    levothyroxine 200 mcg tablet Take 200 mcg by mouth daily in the early morning       Multiple Vitamin (MULTIVITAMIN) tablet Take 1 tablet by mouth daily      Omega-3 Fatty Acids (FISH OIL OMEGA-3 PO) Take by mouth      calcium carbonate-vitamin D (OSCAL-D) 500 mg-200 units per tablet Take 2 tablets by mouth 3 (three) times a day 180 tablet 0    ibuprofen (MOTRIN) 200 mg tablet Take 2 tablets (400 mg total) by mouth every 6 (six) hours as needed for moderate pain 120 tablet 0     No current facility-administered medications on file prior to visit  Social History     Tobacco Use    Smoking status: Former Smoker     Types: Cigarettes     Start date: 2013    Smokeless tobacco: Former User     Types: Chew     Quit date: 2019   Substance Use Topics    Alcohol use: Yes     Frequency: 2-4 times a month     Drinks per session: 1 or 2     Binge frequency: Never     Comment: Socially    Drug use: Never       Family History   Problem Relation Age of Onset    Diabetes Mother     No Known Problems Father        Review of Systems     As stated in the HPI  All other systems were reviewed and are negative  Physical Exam     /80   Pulse 80   Temp 99 1 °F (37 3 °C)   Resp 18   Ht 5' 11" (1 803 m)   Wt 95 3 kg (210 lb)   BMI 29 29 kg/m²     GENERAL: This is a well-developed, well-nourished, age-appropriate patient in no acute distress  The patient is alert and oriented x3  Pleasant and cooperative  Eyes: Anicteric sclerae  Extraocular movements appear intact  HENT: Nares are patent with no drainage  Lungs: There is equal chest rise on inspection  Breathing is non-labored with no audible wheezing  Cardiovascular: No cyanosis  No upper extremity lymphadema  Skin: Skin is warm to touch  No obvious skin lesions or rashes other than described below  Neurologic: No ataxia  Psychiatric: Mood and affect are appropriate      Examination of the right upper extremity reveals no masses lesions or deformities about the wrist   He has supple skin on the dorsum of the wrist   Palpable deformity at the scapholunate interval   He has a positive [place] shift test with a palpable and audible clunk that hurts him  5/5 Motor to the APB, FDI, FDP2, FDP5, EDC  Sensation intact to light touch in the median, radial, and ulnar nerve distribution  Full range of motion about the forearm wrist and fingers with DPC 0 equal to the contralateral side  Brisk capillary refill  Data Review     Results Reviewed     None             Imaging:  No new imaging today    Assessment and Plan      Diagnoses and all orders for this visit:    Tear of right scapholunate ligament, subsequent encounter    Other orders  -     cyanocobalamin (VITAMIN B-12) 100 mcg tablet; Take by mouth daily           23-year-old male with a scapholunate ligament rupture  This is likely subacute to chronic at this point  MRI did show ligament stump, but I did discuss with him that if we were to pursue ligament reconstruction, that this would be reliant on a portion of his extensor carpi radialis tendon that I would use as an autograft into the region just to supplement with a bit more tissue  I would also perform internal brace construct that would help give some more immediate stability and allow for recovery which would pull the scaphoid into a bit of extension as well  I discussed that the long-term data on this construct is not known, and that there is still a possibility that he may need or want surgery in the future for development of arthrosis or if he has significant diastasis with pain once again after surgery  I discussed the more reliable and straightforward recovery is associated with proximal row carpectomy, though this feels a bit final as a young person who needs to use his wrist quite a bit with work  I discussed that we could also make a game time decision    If there is any visible arthrosis in the wrist joint, proximal row carpectomy would be better suited a likely for his desires to avoid any surgical intervention for persistent pain or repeat rupture  Patient specifically requests me to make a game time decision in the operating room to see if he is able to have the reconstruction performed  I discussed that I would lean towards performing the reconstruction and only go toward the proximal row carpectomy if he did have significant radioscaphoid arthrosis  Patient is in good understanding  Risks and benefits of both procedures were discussed with the patient  Recovery for both procedures were discussed with the patient  Patient also notes to me that he is not a good patient and often does not listen to the proper recovery  I discussed that he will be placed into a cast postoperatively which is my typical protocol, and that we hard to mess up, but I do think that we will protect him a little bit more than average        Follow Up:  10 14 days postop    To Do Next Visit:  Three-view x-rays right wrist    PROCEDURES PERFORMED:  Procedures  No Procedures performed today

## 2020-07-20 NOTE — LETTER
July 20, 2020     Patient: Sherrie Guzman   YOB: 1970   Date of Visit: 7/20/2020       To Whom it May Concern:    Sherrie Guzman is under my professional care  He was seen in my office on 7/20/2020  He is schedule for surgery which will take place on 7/24/2020  He should remain out of work for two weeks following his surgery date  Return to work will be reassessed at that time, though I expect him to be able to return with no use of the right hand at that time  If you have any questions or concerns, please don't hesitate to call           Sincerely,          Shawn Parker MD        CC: No Recipients

## 2020-07-21 NOTE — PRE-PROCEDURE INSTRUCTIONS
Pre-Surgery Instructions:   Medication Instructions    acetaminophen (TYLENOL) 325 mg tablet Instructed patient per Anesthesia Guidelines   cholecalciferol (VITAMIN D3) 1,000 units tablet Instructed patient per Anesthesia Guidelines   cyanocobalamin (VITAMIN B-12) 100 mcg tablet Instructed patient per Anesthesia Guidelines   diclofenac (VOLTAREN) 75 mg EC tablet Patient was instructed by Physician and understands   ibuprofen (MOTRIN) 200 mg tablet Patient was instructed by Physician and understands   levothyroxine 200 mcg tablet Instructed patient per Anesthesia Guidelines   Multiple Vitamin (MULTIVITAMIN) tablet Patient was instructed by Physician and understands   Omega-3 Fatty Acids (FISH OIL OMEGA-3 PO) Patient was instructed by Physician and understands  Instructed to take levothyroxine am of surgery with sip ofw ater per anesthesia

## 2020-07-23 ENCOUNTER — ANESTHESIA EVENT (OUTPATIENT)
Dept: PERIOP | Facility: HOSPITAL | Age: 50
End: 2020-07-23
Payer: OTHER GOVERNMENT

## 2020-07-24 ENCOUNTER — HOSPITAL ENCOUNTER (OUTPATIENT)
Facility: HOSPITAL | Age: 50
Setting detail: OUTPATIENT SURGERY
Discharge: HOME | End: 2020-07-24
Attending: ORTHOPAEDIC SURGERY | Admitting: ORTHOPAEDIC SURGERY
Payer: OTHER GOVERNMENT

## 2020-07-24 ENCOUNTER — HOSPITAL ENCOUNTER (OUTPATIENT)
Dept: RADIOLOGY | Facility: HOSPITAL | Age: 50
Setting detail: OUTPATIENT SURGERY
Discharge: HOME | End: 2020-07-24
Payer: OTHER GOVERNMENT

## 2020-07-24 ENCOUNTER — ANESTHESIA (OUTPATIENT)
Dept: PERIOP | Facility: HOSPITAL | Age: 50
End: 2020-07-24
Payer: OTHER GOVERNMENT

## 2020-07-24 VITALS
DIASTOLIC BLOOD PRESSURE: 59 MMHG | HEIGHT: 71 IN | TEMPERATURE: 96.4 F | SYSTOLIC BLOOD PRESSURE: 104 MMHG | BODY MASS INDEX: 29.4 KG/M2 | OXYGEN SATURATION: 96 % | WEIGHT: 210 LBS | HEART RATE: 62 BPM | RESPIRATION RATE: 16 BRPM

## 2020-07-24 DIAGNOSIS — M25.331 SCAPHOLUNATE INSTABILITY OF RIGHT WRIST: Primary | ICD-10-CM

## 2020-07-24 DIAGNOSIS — M19.131 SLAC (SCAPHOLUNATE ADVANCED COLLAPSE) OF WRIST, RIGHT: ICD-10-CM

## 2020-07-24 PROCEDURE — 25215 REMOVAL OF WRIST BONES: CPT | Performed by: ORTHOPAEDIC SURGERY

## 2020-07-24 PROCEDURE — 25215 REMOVAL OF WRIST BONES: CPT | Performed by: PHYSICIAN ASSISTANT

## 2020-07-24 PROCEDURE — 73100 X-RAY EXAM OF WRIST: CPT

## 2020-07-24 PROCEDURE — C1713 ANCHOR/SCREW BN/BN,TIS/BN: HCPCS | Performed by: ORTHOPAEDIC SURGERY

## 2020-07-24 RX ORDER — MIDAZOLAM HYDROCHLORIDE 2 MG/2ML
INJECTION, SOLUTION INTRAMUSCULAR; INTRAVENOUS AS NEEDED
Status: DISCONTINUED | OUTPATIENT
Start: 2020-07-24 | End: 2020-07-24 | Stop reason: SURG

## 2020-07-24 RX ORDER — CEFAZOLIN SODIUM 2 G/50ML
2000 SOLUTION INTRAVENOUS ONCE
Status: COMPLETED | OUTPATIENT
Start: 2020-07-24 | End: 2020-07-24

## 2020-07-24 RX ORDER — MORPHINE SULFATE 10 MG/ML
2 INJECTION, SOLUTION INTRAMUSCULAR; INTRAVENOUS EVERY 2 HOUR PRN
Status: DISCONTINUED | OUTPATIENT
Start: 2020-07-24 | End: 2020-07-24 | Stop reason: HOSPADM

## 2020-07-24 RX ORDER — ACETAMINOPHEN 325 MG/1
650 TABLET ORAL EVERY 6 HOURS PRN
Status: DISCONTINUED | OUTPATIENT
Start: 2020-07-24 | End: 2020-07-24 | Stop reason: HOSPADM

## 2020-07-24 RX ORDER — PROPOFOL 10 MG/ML
INJECTION, EMULSION INTRAVENOUS CONTINUOUS PRN
Status: DISCONTINUED | OUTPATIENT
Start: 2020-07-24 | End: 2020-07-24 | Stop reason: SURG

## 2020-07-24 RX ORDER — ONDANSETRON 2 MG/ML
4 INJECTION INTRAMUSCULAR; INTRAVENOUS ONCE AS NEEDED
Status: DISCONTINUED | OUTPATIENT
Start: 2020-07-24 | End: 2020-07-24 | Stop reason: HOSPADM

## 2020-07-24 RX ORDER — ONDANSETRON 2 MG/ML
INJECTION INTRAMUSCULAR; INTRAVENOUS AS NEEDED
Status: DISCONTINUED | OUTPATIENT
Start: 2020-07-24 | End: 2020-07-24 | Stop reason: SURG

## 2020-07-24 RX ORDER — OXYCODONE HYDROCHLORIDE 5 MG/1
5 TABLET ORAL EVERY 4 HOURS PRN
Qty: 10 TABLET | Refills: 0 | Status: SHIPPED | OUTPATIENT
Start: 2020-07-24

## 2020-07-24 RX ORDER — FENTANYL CITRATE 50 UG/ML
INJECTION, SOLUTION INTRAMUSCULAR; INTRAVENOUS AS NEEDED
Status: DISCONTINUED | OUTPATIENT
Start: 2020-07-24 | End: 2020-07-24 | Stop reason: SURG

## 2020-07-24 RX ORDER — MEPERIDINE HYDROCHLORIDE 25 MG/ML
12.5 INJECTION INTRAMUSCULAR; INTRAVENOUS; SUBCUTANEOUS ONCE AS NEEDED
Status: DISCONTINUED | OUTPATIENT
Start: 2020-07-24 | End: 2020-07-24 | Stop reason: HOSPADM

## 2020-07-24 RX ORDER — HYDROMORPHONE HCL/PF 1 MG/ML
0.5 SYRINGE (ML) INJECTION
Status: DISCONTINUED | OUTPATIENT
Start: 2020-07-24 | End: 2020-07-24 | Stop reason: HOSPADM

## 2020-07-24 RX ORDER — ROPIVACAINE HYDROCHLORIDE 5 MG/ML
INJECTION, SOLUTION EPIDURAL; INFILTRATION; PERINEURAL AS NEEDED
Status: DISCONTINUED | OUTPATIENT
Start: 2020-07-24 | End: 2020-07-24 | Stop reason: SURG

## 2020-07-24 RX ORDER — SCOLOPAMINE TRANSDERMAL SYSTEM 1 MG/1
1 PATCH, EXTENDED RELEASE TRANSDERMAL ONCE AS NEEDED
Status: DISCONTINUED | OUTPATIENT
Start: 2020-07-24 | End: 2020-07-24

## 2020-07-24 RX ORDER — ONDANSETRON 2 MG/ML
4 INJECTION INTRAMUSCULAR; INTRAVENOUS EVERY 6 HOURS PRN
Status: DISCONTINUED | OUTPATIENT
Start: 2020-07-24 | End: 2020-07-24 | Stop reason: HOSPADM

## 2020-07-24 RX ORDER — MAGNESIUM HYDROXIDE 1200 MG/15ML
LIQUID ORAL AS NEEDED
Status: DISCONTINUED | OUTPATIENT
Start: 2020-07-24 | End: 2020-07-24 | Stop reason: HOSPADM

## 2020-07-24 RX ORDER — OXYCODONE HYDROCHLORIDE 5 MG/1
5 TABLET ORAL EVERY 4 HOURS PRN
Status: DISCONTINUED | OUTPATIENT
Start: 2020-07-24 | End: 2020-07-24 | Stop reason: HOSPADM

## 2020-07-24 RX ORDER — FENTANYL CITRATE/PF 50 MCG/ML
50 SYRINGE (ML) INJECTION
Status: DISCONTINUED | OUTPATIENT
Start: 2020-07-24 | End: 2020-07-24 | Stop reason: HOSPADM

## 2020-07-24 RX ORDER — SODIUM CHLORIDE 9 MG/ML
125 INJECTION, SOLUTION INTRAVENOUS CONTINUOUS
Status: DISCONTINUED | OUTPATIENT
Start: 2020-07-24 | End: 2020-07-24 | Stop reason: HOSPADM

## 2020-07-24 RX ORDER — OXYCODONE HYDROCHLORIDE 5 MG/1
10 TABLET ORAL EVERY 4 HOURS PRN
Status: DISCONTINUED | OUTPATIENT
Start: 2020-07-24 | End: 2020-07-24 | Stop reason: HOSPADM

## 2020-07-24 RX ADMIN — MIDAZOLAM 4 MG: 1 INJECTION INTRAMUSCULAR; INTRAVENOUS at 17:18

## 2020-07-24 RX ADMIN — FENTANYL CITRATE 100 MCG: 50 INJECTION, SOLUTION INTRAMUSCULAR; INTRAVENOUS at 17:18

## 2020-07-24 RX ADMIN — CEFAZOLIN SODIUM 2000 MG: 2 SOLUTION INTRAVENOUS at 17:32

## 2020-07-24 RX ADMIN — SODIUM CHLORIDE 125 ML/HR: 0.9 INJECTION, SOLUTION INTRAVENOUS at 13:15

## 2020-07-24 RX ADMIN — ONDANSETRON 4 MG: 2 INJECTION INTRAMUSCULAR; INTRAVENOUS at 19:28

## 2020-07-24 RX ADMIN — ROPIVACAINE HYDROCHLORIDE 30 ML: 5 INJECTION, SOLUTION EPIDURAL; INFILTRATION; PERINEURAL at 17:19

## 2020-07-24 RX ADMIN — PROPOFOL 100 MCG/KG/MIN: 10 INJECTION, EMULSION INTRAVENOUS at 17:29

## 2020-07-24 RX ADMIN — CEFAZOLIN SODIUM 2000 MG: 2 SOLUTION INTRAVENOUS at 17:05

## 2020-07-24 RX ADMIN — SODIUM CHLORIDE: 0.9 INJECTION, SOLUTION INTRAVENOUS at 17:44

## 2020-07-24 NOTE — ANESTHESIA PROCEDURE NOTES
Peripheral Block    Start time: 7/24/2020 5:17 PM  Reason for block: at surgeon's request and post-op pain management  Staffing  Anesthesiologist: William Hartman DO  Performed: anesthesiologist   Preanesthetic Checklist  Completed: patient identified, site marked, surgical consent, pre-op evaluation, timeout performed, IV checked, risks and benefits discussed and monitors and equipment checked  Peripheral Block  Patient position: supine  Prep: ChloraPrep  Patient monitoring: heart rate, continuous pulse ox and frequent blood pressure checks  Block type: supraclavicular  Laterality: right  Injection technique: single-shot  Procedures: ultrasound guided, Ultrasound guidance required for the procedure to increase accuracy and safety of medication placement and decrease risk of complications   and nerve stimulator  Ultrasound permanent image saved  Needle  Needle type: Stimuplex   Needle gauge: 22 G  Needle length: 5 cm  Needle localization: ultrasound guidance and nerve stimulator  Test dose: negative  Assessment  Injection assessment: incremental injection, local visualized surrounding nerve on ultrasound, negative aspiration for CSF, negative aspiration for heme and no paresthesia on injection  Paresthesia pain: none  Heart rate change: no  Slow fractionated injection: yes  Post-procedure:  site cleaned  patient tolerated the procedure well with no immediate complications

## 2020-07-24 NOTE — ANESTHESIA PREPROCEDURE EVALUATION
Review of Systems/Medical History  Patient summary reviewed  Chart reviewed  History of anesthetic complications PONV    Cardiovascular  Negative cardio ROS Exercise tolerance (METS): >4,     Pulmonary  Sleep apnea ,        GI/Hepatic  Negative GI/hepatic ROS          Kidney stones,        Endo/Other  History of thyroid disease , hypothyroidism, Parathyroid disease hyperparathyroidism,      GYN       Hematology  Negative hematology ROS      Musculoskeletal  Negative musculoskeletal ROS        Neurology  Negative neurology ROS      Psychology   Negative psychology ROS              Physical Exam    Airway    Mallampati score: I  TM Distance: >3 FB  Neck ROM: full     Dental   No notable dental hx     Cardiovascular  Comment: Negative ROS, Rate: normal,     Pulmonary  Breath sounds clear to auscultation,     Other Findings        Anesthesia Plan  ASA Score- 2     Anesthesia Type- general with ASA Monitors  Additional Monitors:   Airway Plan:     Comment: Preop block for postop pain control discussed  Plan Factors-Patient not instructed to abstain from smoking on day of procedure  Patient did not smoke on day of surgery  Induction- intravenous  Postoperative Plan- Plan for postoperative opioid use  Informed Consent- Anesthetic plan and risks discussed with patient and spouse

## 2020-07-25 NOTE — ANESTHESIA POSTPROCEDURE EVALUATION
Post-Op Assessment Note    CV Status:  Stable    Pain management: adequate     Mental Status:  Alert and awake   Hydration Status:  Euvolemic   PONV Controlled:  Controlled   Airway Patency:  Patent   Post Op Vitals Reviewed: Yes      Staff: Anesthesiologist           /59 (07/24/20 2047)    Temp     Pulse 62 (07/24/20 2047)   Resp 16 (07/24/20 2047)    SpO2 96 % (07/24/20 2047)

## 2020-07-25 NOTE — OP NOTE
DATE OF SURGERY: 7/24/2020    SURGEON: Joshua Choe MD    PREOPERATIVE DIAGNOSIS:  Right wrist scapholunate dissociation    POSTOPERATIVE DIAGNOSIS:  Right wrist stage II SLAC    PROCEDURE:  Proximal row carpectomy right wrist    IMPLANTS: * No implants in log *     ASSISTANTS: Surgeon(s) and Role:     Alicia Villalta PA-C - Assisting       ANESTHESIA: General    ESTIMATED BLOOD LOSS: Minimal     INTRAVENOUS FLUIDS: Per anesthesia    URINE OUTPUT:  No Lee    TOURNIQUET TIME:  Less than 100 minutes     COMPLICATIONS:  None    ANTIBIOTICS:  2 g Ancef    SPECIMENS: * No specimens in log *         INDICATIONS: Long Mitchell is a 53y o  year old male who sustained the above conditions after an injury sustained 15 years ago with a re-exacerbation this past fall  The indications for operative intervention were scapholunate ligament dissociation with questionable arthritis  The alternatives to operative intervention included nonoperative care  The risks of the operative procedure were discussed in detail with the patient and his wife including but not limited to the risks of anesthesia, infection, injury to blood vessel/nerve, pain, stiffness, changes in sensation, and the need for repeat surgery  The patient understood these risks and alternatives and elected to proceed with surgery  DESCRIPTION OF PROCEDURE: The patient was seen in the preoperative holding area and identification was performed as per the institution's protocol  The patient was then brought to the operating room, a briefing was held and prophylactic antibiotics were given  Anesthesia was induced  A tourniquet cuff was applied to the operative extremity, set at 250 mmHg The site was pre-scrubbed with chlorhexidine and prepped with ChloraPrep and draped in the usual sterile fashion  Following a timeout procedure, an approach was made over the dorsal wrist in line with the 3rd and 4th extensor compartment    Dissection was carried down through skin and subcutaneous tissue and any large crossing veins were retracted  Dissection was carried down to the retinaculum and blunt dissection was carried over the retinaculum and dorsal fascia to free up the large subcutaneous pocket  Sharp dissection allowed us to enter the 3rd extensor compartment taking care to protect and retract the branches of the superficial radial nerve distally  We released EPL proximally from its adhesions and transposed it    we then identified the joint capsule and incised it in an inverted T-type capsulotomy  We readily identified the scapholunate ligament rupture  There was some arthritis noted at the radioscaphoid joint  There were also loose fracture bodies noted within the substance of the scapholunate ligament along the dorsal scaphoid at its attachment point as well as one over the dorsal capitate  There was also arthrosis within the scapholunate interval   We elected to proceed with the reconstruction as this was the patient's initial wish  We identified the 2nd dorsal compartment and isolated the extensor carpi radialis brevis tendon  A 2 mm strip of the ulnar most aspect of the tendon was harvested at about 10 cm of length total   This was to be used for the graft in reconstruction  We then isolated the dorsal lunate and dorsal scaphoid  We placed joysticks within the ulnar aspect of the lunate and the midportion of the scaphoid in order to stay out of the zone of future anchor placement  We then placed guide pins using fluoroscopy and direct visualization looking into the capital lunate joint scaphoid capitate joint and the scapholunate joint to ensure that there was no penetration of the guide pin  One redirection of the proximal scaphoid pin was made  We also placed a lunate pin as well as the distal scaphoid pin for dorsal reconstruction with tendon autograft and suture tape as well    We then drilled our proximal scaphoid and proximal lunate tunnels using a 3 0 mm drill  The graft and suture tape was then placed into the proximal scaphoid drill tunnel with a 3 5 mm SwiveLock  Upon placement which was quite gentle, the scaphoid fractured longitudinally through the drill hole, the K-wire hole from the previous guide pin placement and into the joystick K-wire hole  Given this fracture as well as the poor quality of the bone as well as the amount of arthrosis that was present, we then proceeded with a proximal row carpectomy which we had discussed with the patient preoperatively as an option for him  We then incised all ligament surrounding the scaphoid and removed the scaphoid piecemeal so as to prevent any injury to the radioscaphoid capitate ligament palmarly which we noted was intact  We then removed the lunate en toto using Hohmann retractor, knife, and rongeur as well as the triquetrum  We took care not to disturb any of the palmar ligaments as well as the dorsal distal radial ulnar joint stabilizers  The wounds were copiously irrigated and closed in layers including a 3-0 Monocryl in the capsule as well as in the retinaculum  Three-0 nylon was then placed in the skin  Sterile dressing was then applied as well as a volar resting splint  All sharps and sponge counts were correct and there were no complications  I attest that I, Baltazar Woods, was present and scrubbed for the entirety of the procedure  No qualified resident was available to assist with this case  and A physician assistant was required during the procedure for retraction, tissue handling, dissection and suturing  The patient was awoken from anesthesia in good condition and taken to the PACU         PLAN: The patient will follow up in 10-14 days

## 2020-07-25 NOTE — DISCHARGE INSTRUCTIONS
[facility] - Dr Mulu Choi (Orthopedic Surgery)    Follow-up Appointments   Please call to set up/confirm your first postoperative visit with Dr Osiris Delarosa in 10-14 days    Dressing and [address] A dressing has been placed on your hand/arm to keep the incisions clean  Keep your dressing clean and dry  o Do not remove the surgical dressing/splint  It will be removed at your first postoperative office visit with the doctor or therapist     Chandra Conrad a plastic bag over your dressing/splint whenever you take a shower or bath until you are allowed to remove it   Swelling is normal after surgery  Elevate your hand/arm so the surgical site is above your heart to decrease the swelling  Swelling is like water, it runs downhill  This is especially important for the first 72 hours after surgery  o The best way to elevate your hand/arm is with your fingers pointing towards the ceiling and your hand/arm above the level of the heart   o You can use pillows to help prop your hand/arm up when sitting or lying down   If you are experiencing pain, be sure you are elevating your hand/arm as often as possible   Apply an ice pack over your dressing/splint for 20 minutes of every hour for the first 3 days when you are awake  This can help to reduce swelling and inflammation  Be sure the ice pack is waterproof so it does not leak on the dressing/splint  A simple ice pack can be made by adding ten cubes and a small amount of water in a small zip-lock bag  Seal this small bag tightly  Place this small bag in a larger zip lock bag  Apply to the area in pain   If the dressing feels too tight in spite of elevation, loosen the outer wrap but do not remove the entire dressing     If you have exposed pins/wires, take clean gauze or a cotton tip applicator (like a Q-tip), get it wet in clean warm water mixed with non-scented hand soap (like Juan José, [place], Dial, etc ), and gently wipe around the base of the pin where it comes through the skin once a day  Do not use water from a well to clean as this has bacteria in it and can contribute to infections  ACTIVITIES:  [facility] and straighten the parts of your hand, wrist, elbow, and shoulder that are not included in your surgical dressing or splint  Do this at least 6 times a day, as this will help decrease swelling and speed up your recovery  This includes your fingers  See the instructions listed below for finger motion  THIS IS VERY IMPORTANT FOR YOUR RECOVERY! POSTOPERATIVE CARE/CONCERNS:  Pauline Mccartney You may experience some temporary numbness in your fingers   You should have very little to no bleeding on your dressing   Notify the office (see contact info at bottom of page) for any of the following:  o Excessive pain not relieved by rest, elevation, and pain medications  o Feeling that the dressing is too tight in spite of adequately elevating hand/arm  o Active bleeding through the dressing  o Drainage from the wound site or pin sites  o Foul odor from the dressing/wound  o Temperature greater that 101? F or chills  o Blue or excessively cold fingertips  o Numbness of the fingertips that does not improve in spite of adequately elevating hand/arm    PAIN MEDICATION:   Pain is a normal part of the recovery after surgery  The pain medication provided to you will help to decrease the discomfort but will not completely eliminate the pain   A prescription for a narcotic pain medicine (oxycodone or hydrocodone) was called in to your pharmacy  Please take anti-inflammatory medication (NSAID like ibuprofen or naproxen) AND over-the-counter acetaminophen (Tylenol) regularly  The instructions will be listed on the bottles  The narcotic pain medicine should be used for pain that is not controlled by these other medications and only for the first few days after surgery   The narcotic is HIGHLY ADDICTIVE and has many side effects such as causing constipation, dizziness, confusion, decreased breathing and more  It is safe to take for a short period of time after surgery  It is almost never prescribed for longer than a few weeks and never after one month for elective surgeries   Do not take narcotic or anti-inflammatories on an empty stomach   It is illegal to drive while taking narcotic pain medication   Your pain should decrease over the first few days after surgery which will allow you to take less pain medicine, increase the time between doses of medication, or stop taking all pain medicine        OFFICE CONTACT NUMBERS   Please call [phone] with any questions about appointments or any medical concerns

## 2020-08-05 ENCOUNTER — TELEPHONE (OUTPATIENT)
Dept: OBGYN CLINIC | Facility: MEDICAL CENTER | Age: 50
End: 2020-08-05

## 2020-08-05 NOTE — TELEPHONE ENCOUNTER
COVID Pre-Visit Screening     1  Is this a family member screening? No  2  Have you traveled outside of your state in the past 2 weeks? Yes: Quarantine recommendations for [place] or [place] were reviewed with patient and patient DOES NOT meet criteria for quarantine: Yes, went to [place]  3  Do you presently have a fever or flu-like symptoms? No  4  Do you have symptoms of an upper respiratory infection like runny nose, sore throat, or cough? No  5  Are you suffering from new headache that you have not had in the past?  No  6  Do you have/have you experienced any new shortness of breath recently? No  7  Do you have any new diarrhea, nausea or vomiting? No  8  Have you been in contact with anyone who has been sick or diagnosed with COVID-19? No  9  Do you have any new loss of taste or smell? No  10  Are you able to wear a mask without a valve for the entire visit?  Yes

## 2020-08-06 ENCOUNTER — APPOINTMENT (OUTPATIENT)
Dept: RADIOLOGY | Facility: MEDICAL CENTER | Age: 50
End: 2020-08-06
Payer: OTHER GOVERNMENT

## 2020-08-06 ENCOUNTER — OFFICE VISIT (OUTPATIENT)
Dept: OBGYN CLINIC | Facility: MEDICAL CENTER | Age: 50
End: 2020-08-06
Payer: OTHER GOVERNMENT

## 2020-08-06 VITALS
DIASTOLIC BLOOD PRESSURE: 80 MMHG | HEIGHT: 71 IN | TEMPERATURE: 98.9 F | BODY MASS INDEX: 29.4 KG/M2 | WEIGHT: 210 LBS | SYSTOLIC BLOOD PRESSURE: 127 MMHG | HEART RATE: 73 BPM

## 2020-08-06 DIAGNOSIS — S63.8X1D TEAR OF RIGHT SCAPHOLUNATE LIGAMENT, SUBSEQUENT ENCOUNTER: ICD-10-CM

## 2020-08-06 DIAGNOSIS — Z98.890 STATUS POST PROXIMAL ROW CARPECTOMY OF WRIST: Primary | ICD-10-CM

## 2020-08-06 PROCEDURE — 99024 POSTOP FOLLOW-UP VISIT: CPT | Performed by: ORTHOPAEDIC SURGERY

## 2020-08-06 PROCEDURE — 73110 X-RAY EXAM OF WRIST: CPT

## 2020-08-06 PROCEDURE — 29075 APPL CST ELBW FNGR SHORT ARM: CPT | Performed by: ORTHOPAEDIC SURGERY

## 2020-08-06 NOTE — PROGRESS NOTES
History of the Present Illness     Sherrie Guzman is a 53 y o  male 13 days s/p right wrist, proximal row carpectomy  He is doing well at this time  He has been wearing his splint as instructed  He complains mostly of itching and skin irritation  He denies any numbness, tingling, fever or chills  Physical Exam     /80   Pulse 73   Temp 98 9 °F (37 2 °C)   Ht 5' 11" (1 803 m)   Wt 95 3 kg (210 lb)   BMI 29 29 kg/m²     Right wrist:  Incision is clean, dry and intact  No redness or erythema  Minimal swelling  DPC 3-4  Sensation intact to light touch in the median, radial, and ulnar nerve distribution  Brisk capillary refill  Distal pulse intact          Data Review       Imaging:  Right wrist xray 8/6/2020: post surgical changes s/p proximal row carpectomy     Assessment and Plan       53 y o  male s/p right proximal row carpectomy, doing well at this time  He was transitioned to a cast today for comfort and compliance  This will remain on for 3-4 weeks  Instructed on finger range of motion which is critical this time  Continue to ice and elevate  Follow Up: 4 weeks    To Do Next Visit: cast off, clinical exam    PROCEDURES PERFORMED:  Cast application    Date/Time: 8/6/2020 3:02 PM  Performed by: Shawn Parker MD  Authorized by: Shawn Parker MD     Consent:     Consent obtained:  Verbal    Consent given by:  Patient    Risks discussed:  Discoloration, numbness, pain and swelling  Pre-procedure details:     Sensation:  Normal  Procedure details:     Laterality:  Right    Location:  WristCast type:  Short arm    Supplies:  Cotton padding and fiberglass  Post-procedure details:     Pain:  Unchanged    Sensation:  Normal    Patient tolerance of procedure:   Tolerated well, no immediate complications           Scribe Attestation    I,:   Mony Barahona MA am acting as a scribe while in the presence of the attending physician :        I,:   Shawn Parker MD personally performed the services described in this documentation    as scribed in my presence :

## 2020-08-06 NOTE — PATIENT INSTRUCTIONS
Cast and Splint Care    Cast and Splint Care  Keep your cast/splint clean and dry  Being in contact with damp padding can irritate your skin  Plaster gets softer and weaker when it gets wet  Use plastic bags or a waterproof cast cover to keep your splint or cast dry when bathing  Seal the bag with tape or rubber bands  Elevate your hand in the shower above your head, because otherwise water can still run under the seal and into your cast  Do not keep it constantly covered because moisture may build up from normal sweating  Do not let dirt, sand, or other materials get inside of your splint or cast  If you feel itchy, do not place anything inside your cast because you can injure your skin; instead, use a blow dryer on a "cool" setting to blow air down the cast  If this doesn't work, call the office for advice  Never trim the cast or splint by yourself  If there are rough edges or if your skin gets irritated around the edges of the cast, call the office since they have the proper tools to fix it  If the cast or splint develops cracks or soft spots, contact the office to see if it needs to be repaired or changed  Cast Removal  Never try to remove a cast yourself; you may cut your skin or prevent proper healing of your injury  A cast should be removed only by a professional with the proper tools and training  Casts are removed with a special type of saw that will not cut your skin  Remember, a cast is there to protect you while your injury heals  It is only a temporary inconvenience, with the goal of helping you recover

## 2020-08-06 NOTE — LETTER
August 6, 2020     Patient: Caesar Camp   YOB: 1970   Date of Visit: 8/6/2020       To Whom it May Concern:    Caesar Camp is under my professional care  He was seen in my office on 8/6/2020  He can return to work/duty with the following restrictions: No use of the right arm  If you have any questions or concerns, please don't hesitate to call           Sincerely,          Cristal Tamez MD        CC: No Recipients

## 2020-08-06 NOTE — PROGRESS NOTES
Chief Complaint     ***      History of Present Illness     Estela Wilson is a 53 y o  male ***          Past Medical History:   Diagnosis Date    Hyperparathyroidism ([address] )     surgery 3/4/20    Hypothyroidism     Hypothyroidism     Kidney stone     Mild to moderate hearing loss     OA (osteoarthritis) of knee     arleen    PONV (postoperative nausea and vomiting)     Slac (scapholunate advanced collapse) of wrist, right     Sleep apnea     + Mild Sleep Apne-Uses Dental Device    Wears glasses        Past Surgical History:   Procedure Laterality Date    CARPECTOMY PROXIMAL ROW Right 7/24/2020    Procedure: Wrist proximal row carpectomy;  Surgeon: Dominga Cox MD;  Location: [facility];  Service: Orthopedics    FACIAL/NECK BIOPSY Right 3/4/2020    Procedure: PREAURICULAR SKIN TAG EXCISION;  Surgeon: Bay De MD;  [facility];  Service: ENT    FL INJECTION RIGHT SHOULDER (ARTHROGRAM)  12/4/2019    FL INJECTION RIGHT WRIST (ARTHROGRAM)  5/13/2020    KIDNEY STONE SURGERY      OH EXPLORE PARATHYROID GLANDS Left 3/4/2020    Procedure: LEFT PARATHYROIDECTOMY WITH INTRAOP PTH;  Surgeon: Bay De MD;  [facility];  Service: ENT    VARICOCELE EXCISION      WISDOM TOOTH EXTRACTION         Allergies   Allergen Reactions    Meloxicam Dermatitis       Current Outpatient Medications on File Prior to Visit   Medication Sig Dispense Refill    acetaminophen (TYLENOL) 325 mg tablet 2, by mouth, every 6 hours as needed for mild to moderate pain   30 tablet 0    cholecalciferol (VITAMIN D3) 1,000 units tablet Take 1,000 Units by mouth daily      cyanocobalamin (VITAMIN B-12) 100 mcg tablet Take by mouth daily      diclofenac (VOLTAREN) 75 mg EC tablet Take 1 tablet (75 mg total) by mouth 2 (two) times a day PRN for pain 60 tablet 1    levothyroxine 200 mcg tablet Take 200 mcg by mouth daily in the early morning       Multiple Vitamin (MULTIVITAMIN) tablet Take 1 tablet by mouth daily  Omega-3 Fatty Acids (FISH OIL OMEGA-3 PO) Take by mouth daily       oxyCODONE (ROXICODONE) 5 mg immediate release tablet Take 1 tablet (5 mg total) by mouth every 4 (four) hours as needed for moderate pain 1-2 tabs po q4-6 hrs prn painMax Daily Amount: 30 mg (Patient not taking: Reported on 2020) 10 tablet 0     No current facility-administered medications on file prior to visit  Social History     Tobacco Use    Smoking status: Former Smoker     Types: Cigarettes     Last attempt to quit: 2013     Years since quittin 0    Smokeless tobacco: Former User     Types: Chew     Quit date:    Substance Use Topics    Alcohol use: Yes     Frequency: 2-4 times a month     Drinks per session: 1 or 2     Binge frequency: Never     Comment: wine/ beer / liquor    Drug use: Never       Family History   Problem Relation Age of Onset    Diabetes Mother     No Known Problems Father        Review of Systems     As stated in the HPI  All other systems were reviewed and are negative  Physical Exam     /80   Pulse 73   Temp 98 9 °F (37 2 °C)   Ht 5' 11" (1 803 m)   Wt 95 3 kg (210 lb)   BMI 29 29 kg/m²     GENERAL: This is a well-developed, well-nourished, age-appropriate patient in no acute distress  The patient is alert and oriented x3  Pleasant and cooperative  Eyes: Anicteric sclerae  Extraocular movements appear intact  HENT: Nares are patent with no drainage  Lungs: There is equal chest rise on inspection  Breathing is non-labored with no audible wheezing  Cardiovascular: No cyanosis  No upper extremity lymphadema  Skin: Skin is warm to touch  No obvious skin lesions or rashes other than described below  Neurologic: No ataxia  Psychiatric: Mood and affect are appropriate      Right wrist:      Data Review     Results Reviewed     None             Imaging:  ***    Assessment and Plan      Diagnoses and all orders for this visit:    Tear of right scapholunate ligament, subsequent encounter  -     XR wrist 3+ vw right; Future             ***      Follow Up: 4 weeks    To Do Next Visit: cast off, clinical exam    PROCEDURES PERFORMED:  Procedures  {Was [facility] done:88301::"No Procedures performed today"}       Scribe Attestation    I,:    am acting as a scribe while in the presence of the attending physician :        I,:    personally performed the services described in this documentation    as scribed in my presence :

## 2020-09-09 ENCOUNTER — OFFICE VISIT (OUTPATIENT)
Dept: OBGYN CLINIC | Facility: MEDICAL CENTER | Age: 50
End: 2020-09-09

## 2020-09-09 VITALS
TEMPERATURE: 99 F | HEART RATE: 71 BPM | HEIGHT: 71 IN | WEIGHT: 210 LBS | BODY MASS INDEX: 29.4 KG/M2 | SYSTOLIC BLOOD PRESSURE: 127 MMHG | DIASTOLIC BLOOD PRESSURE: 77 MMHG

## 2020-09-09 DIAGNOSIS — Z98.890 STATUS POST PROXIMAL ROW CARPECTOMY OF WRIST: Primary | ICD-10-CM

## 2020-09-09 PROCEDURE — 99024 POSTOP FOLLOW-UP VISIT: CPT | Performed by: ORTHOPAEDIC SURGERY

## 2020-09-09 NOTE — LETTER
September 9, 2020     Patient: Lubna Gabriel   YOB: 1970   Date of Visit: 9/9/2020       To Whom it May Concern:    Lubna Gabriel is under my professional care  He was seen in my office on 9/9/2020  He may return to work with a 2-3lb weightbearing restriction in brace  If you have any questions or concerns, please don't hesitate to call           Sincerely,          Serenity Willard MD        CC: No Recipients

## 2020-09-09 NOTE — PROGRESS NOTES
History of the Present Illness     Corby Davis is a 53 y o  male presenting for postoperative visit following right wrist proximal row carpectomy  He is now 7 weeks status post   At his last visit he was placed in a cast  He was compliant with his cast care instructions  He complained of some itching while in the cast and once the cast was removed he describes stiffness  Denies any numbness or tingling           Physical Exam     /77   Pulse 71   Temp 99 °F (37 2 °C)   Ht 5' 11" (1 803 m)   Wt 95 3 kg (210 lb)   BMI 29 29 kg/m²     Right UE:  Incision well healed  DPC 0  Wrist flexion 30°  Wrist extension 0°  No tenderness to palpation on the radial aspect of the wrist but there is mild tenderness to palpation at the level of the hamate  Mild pain with ulnar deviation felt on the ulnar side  5/5 Motor to the APB, FDI, FDP2, FDP5, EDC  Sensation intact to light touch in the median, radial, and ulnar nerve distribution  Brisk capillary refill  Palpable radial pulse    Data Review       Imaging:  None today    Assessment and Plan     45-year-old male status post right wrist proximal row carpectomy, progressing appropriately at this time  His cast was removed today and he may transition to a cock-up wrist brace  He has 1 that was provided previously and says is is in good condition and declined a new 1 today  He was instructed to wear this for 1 more month with activity  Work note was provided for 2-3 lb lifting restriction while in the brace  He was prescribed hand therapy to begin active and active assisted range of motion for the wrist, a new order will be provided at next visit allowing him to progress to strengthening  I recommended going to physical therapy 1 time per week and supplementing the other days with home exercises that will be provided  He described ulnar-sided wrist pain on today's exam, this is likely secondary due to immobilization    I would like him to monitor this and let us know if this does not resolve        Follow Up:  1 month    To Do Next Visit:  Repeat exam    PROCEDURES PERFORMED:    No Procedures performed today     Scribe Attestation    I,:   Elmer Palumbo MA am acting as a scribe while in the presence of the attending physician :        I,:   Devi Mathews MD personally performed the services described in this documentation    as scribed in my presence :

## 2020-09-11 ENCOUNTER — TELEPHONE (OUTPATIENT)
Dept: OBGYN CLINIC | Facility: HOSPITAL | Age: 50
End: 2020-09-11

## 2020-10-12 ENCOUNTER — EVALUATION (OUTPATIENT)
Dept: PHYSICAL THERAPY | Facility: MEDICAL CENTER | Age: 50
End: 2020-10-12
Payer: OTHER GOVERNMENT

## 2020-10-12 DIAGNOSIS — Z98.890 STATUS POST PROXIMAL ROW CARPECTOMY OF WRIST: ICD-10-CM

## 2020-10-12 DIAGNOSIS — M25.631 WRIST STIFFNESS, RIGHT: Primary | ICD-10-CM

## 2020-10-12 PROCEDURE — 97161 PT EVAL LOW COMPLEX 20 MIN: CPT | Performed by: PHYSICAL THERAPIST

## 2020-10-12 PROCEDURE — 97110 THERAPEUTIC EXERCISES: CPT | Performed by: PHYSICAL THERAPIST

## 2020-10-13 ENCOUNTER — OFFICE VISIT (OUTPATIENT)
Dept: OBGYN CLINIC | Facility: MEDICAL CENTER | Age: 50
End: 2020-10-13

## 2020-10-13 VITALS
WEIGHT: 212 LBS | SYSTOLIC BLOOD PRESSURE: 135 MMHG | TEMPERATURE: 98.6 F | BODY MASS INDEX: 29.68 KG/M2 | HEART RATE: 77 BPM | HEIGHT: 71 IN | DIASTOLIC BLOOD PRESSURE: 85 MMHG

## 2020-10-13 DIAGNOSIS — Z98.890 STATUS POST PROXIMAL ROW CARPECTOMY OF WRIST: Primary | ICD-10-CM

## 2020-10-13 PROCEDURE — 99024 POSTOP FOLLOW-UP VISIT: CPT | Performed by: ORTHOPAEDIC SURGERY

## 2020-10-15 ENCOUNTER — OFFICE VISIT (OUTPATIENT)
Dept: PHYSICAL THERAPY | Facility: MEDICAL CENTER | Age: 50
End: 2020-10-15
Payer: OTHER GOVERNMENT

## 2020-10-15 DIAGNOSIS — Z98.890 STATUS POST PROXIMAL ROW CARPECTOMY OF WRIST: Primary | ICD-10-CM

## 2020-10-15 DIAGNOSIS — M25.631 WRIST STIFFNESS, RIGHT: ICD-10-CM

## 2020-10-15 PROCEDURE — 97110 THERAPEUTIC EXERCISES: CPT | Performed by: PHYSICAL THERAPIST

## 2020-10-15 PROCEDURE — 97140 MANUAL THERAPY 1/> REGIONS: CPT | Performed by: PHYSICAL THERAPIST

## 2020-10-20 ENCOUNTER — OFFICE VISIT (OUTPATIENT)
Dept: PHYSICAL THERAPY | Facility: MEDICAL CENTER | Age: 50
End: 2020-10-20
Payer: OTHER GOVERNMENT

## 2020-10-20 DIAGNOSIS — Z98.890 STATUS POST PROXIMAL ROW CARPECTOMY OF WRIST: Primary | ICD-10-CM

## 2020-10-20 DIAGNOSIS — M25.631 WRIST STIFFNESS, RIGHT: ICD-10-CM

## 2020-10-20 PROCEDURE — 97110 THERAPEUTIC EXERCISES: CPT | Performed by: PHYSICAL THERAPIST

## 2020-10-20 PROCEDURE — 97140 MANUAL THERAPY 1/> REGIONS: CPT | Performed by: PHYSICAL THERAPIST

## 2020-10-22 ENCOUNTER — OFFICE VISIT (OUTPATIENT)
Dept: PHYSICAL THERAPY | Facility: MEDICAL CENTER | Age: 50
End: 2020-10-22
Payer: OTHER GOVERNMENT

## 2020-10-22 DIAGNOSIS — M25.631 WRIST STIFFNESS, RIGHT: ICD-10-CM

## 2020-10-22 DIAGNOSIS — Z98.890 STATUS POST PROXIMAL ROW CARPECTOMY OF WRIST: Primary | ICD-10-CM

## 2020-10-22 PROCEDURE — 97140 MANUAL THERAPY 1/> REGIONS: CPT | Performed by: PHYSICAL THERAPIST

## 2020-10-22 PROCEDURE — 97110 THERAPEUTIC EXERCISES: CPT | Performed by: PHYSICAL THERAPIST

## 2020-10-27 ENCOUNTER — APPOINTMENT (OUTPATIENT)
Dept: PHYSICAL THERAPY | Facility: MEDICAL CENTER | Age: 50
End: 2020-10-27
Payer: OTHER GOVERNMENT

## 2020-10-29 ENCOUNTER — OFFICE VISIT (OUTPATIENT)
Dept: PHYSICAL THERAPY | Facility: MEDICAL CENTER | Age: 50
End: 2020-10-29
Payer: OTHER GOVERNMENT

## 2020-10-29 DIAGNOSIS — M25.631 WRIST STIFFNESS, RIGHT: ICD-10-CM

## 2020-10-29 DIAGNOSIS — Z98.890 STATUS POST PROXIMAL ROW CARPECTOMY OF WRIST: Primary | ICD-10-CM

## 2020-10-29 PROCEDURE — 97140 MANUAL THERAPY 1/> REGIONS: CPT | Performed by: PHYSICAL THERAPIST

## 2020-10-29 PROCEDURE — 97110 THERAPEUTIC EXERCISES: CPT | Performed by: PHYSICAL THERAPIST

## 2020-11-03 ENCOUNTER — OFFICE VISIT (OUTPATIENT)
Dept: PHYSICAL THERAPY | Facility: MEDICAL CENTER | Age: 50
End: 2020-11-03
Payer: OTHER GOVERNMENT

## 2020-11-03 DIAGNOSIS — Z98.890 STATUS POST PROXIMAL ROW CARPECTOMY OF WRIST: Primary | ICD-10-CM

## 2020-11-03 DIAGNOSIS — M25.631 WRIST STIFFNESS, RIGHT: ICD-10-CM

## 2020-11-03 PROCEDURE — 97110 THERAPEUTIC EXERCISES: CPT | Performed by: PHYSICAL THERAPIST

## 2020-11-03 PROCEDURE — 97140 MANUAL THERAPY 1/> REGIONS: CPT | Performed by: PHYSICAL THERAPIST

## 2020-11-05 ENCOUNTER — OFFICE VISIT (OUTPATIENT)
Dept: PHYSICAL THERAPY | Facility: MEDICAL CENTER | Age: 50
End: 2020-11-05
Payer: OTHER GOVERNMENT

## 2020-11-05 DIAGNOSIS — Z98.890 STATUS POST PROXIMAL ROW CARPECTOMY OF WRIST: Primary | ICD-10-CM

## 2020-11-05 DIAGNOSIS — M25.631 WRIST STIFFNESS, RIGHT: ICD-10-CM

## 2020-11-05 PROCEDURE — 97110 THERAPEUTIC EXERCISES: CPT | Performed by: PHYSICAL THERAPIST

## 2020-11-05 PROCEDURE — 97140 MANUAL THERAPY 1/> REGIONS: CPT | Performed by: PHYSICAL THERAPIST

## 2020-11-10 ENCOUNTER — OFFICE VISIT (OUTPATIENT)
Dept: OBGYN CLINIC | Facility: MEDICAL CENTER | Age: 50
End: 2020-11-10
Payer: OTHER GOVERNMENT

## 2020-11-10 ENCOUNTER — OFFICE VISIT (OUTPATIENT)
Dept: PHYSICAL THERAPY | Facility: MEDICAL CENTER | Age: 50
End: 2020-11-10
Payer: OTHER GOVERNMENT

## 2020-11-10 VITALS
HEART RATE: 76 BPM | WEIGHT: 214 LBS | HEIGHT: 71 IN | BODY MASS INDEX: 29.96 KG/M2 | TEMPERATURE: 98.1 F | DIASTOLIC BLOOD PRESSURE: 77 MMHG | SYSTOLIC BLOOD PRESSURE: 127 MMHG

## 2020-11-10 DIAGNOSIS — Z98.890 STATUS POST PROXIMAL ROW CARPECTOMY OF WRIST: Primary | ICD-10-CM

## 2020-11-10 DIAGNOSIS — M25.631 WRIST STIFFNESS, RIGHT: ICD-10-CM

## 2020-11-10 PROCEDURE — 97110 THERAPEUTIC EXERCISES: CPT | Performed by: PHYSICAL THERAPIST

## 2020-11-10 PROCEDURE — 99213 OFFICE O/P EST LOW 20 MIN: CPT | Performed by: ORTHOPAEDIC SURGERY

## 2020-11-12 ENCOUNTER — APPOINTMENT (OUTPATIENT)
Dept: PHYSICAL THERAPY | Facility: MEDICAL CENTER | Age: 50
End: 2020-11-12
Payer: OTHER GOVERNMENT

## 2020-11-17 ENCOUNTER — OFFICE VISIT (OUTPATIENT)
Dept: PHYSICAL THERAPY | Facility: MEDICAL CENTER | Age: 50
End: 2020-11-17
Payer: OTHER GOVERNMENT

## 2020-11-17 DIAGNOSIS — Z98.890 STATUS POST PROXIMAL ROW CARPECTOMY OF WRIST: Primary | ICD-10-CM

## 2020-11-17 DIAGNOSIS — M25.631 WRIST STIFFNESS, RIGHT: ICD-10-CM

## 2020-11-17 PROCEDURE — 97110 THERAPEUTIC EXERCISES: CPT | Performed by: PHYSICAL THERAPIST

## 2020-11-19 ENCOUNTER — APPOINTMENT (OUTPATIENT)
Dept: PHYSICAL THERAPY | Facility: MEDICAL CENTER | Age: 50
End: 2020-11-19
Payer: OTHER GOVERNMENT

## 2020-11-24 ENCOUNTER — OFFICE VISIT (OUTPATIENT)
Dept: PHYSICAL THERAPY | Facility: MEDICAL CENTER | Age: 50
End: 2020-11-24
Payer: OTHER GOVERNMENT

## 2020-11-24 DIAGNOSIS — Z98.890 STATUS POST PROXIMAL ROW CARPECTOMY OF WRIST: Primary | ICD-10-CM

## 2020-11-24 DIAGNOSIS — M25.631 WRIST STIFFNESS, RIGHT: ICD-10-CM

## 2020-11-24 PROCEDURE — 97140 MANUAL THERAPY 1/> REGIONS: CPT | Performed by: PHYSICAL THERAPIST

## 2020-11-24 PROCEDURE — 97110 THERAPEUTIC EXERCISES: CPT | Performed by: PHYSICAL THERAPIST

## 2020-12-03 ENCOUNTER — OFFICE VISIT (OUTPATIENT)
Dept: PHYSICAL THERAPY | Facility: MEDICAL CENTER | Age: 50
End: 2020-12-03
Payer: OTHER GOVERNMENT

## 2020-12-03 DIAGNOSIS — Z98.890 STATUS POST PROXIMAL ROW CARPECTOMY OF WRIST: Primary | ICD-10-CM

## 2020-12-03 DIAGNOSIS — M25.631 WRIST STIFFNESS, RIGHT: ICD-10-CM

## 2020-12-03 PROCEDURE — 97110 THERAPEUTIC EXERCISES: CPT | Performed by: PHYSICAL THERAPIST

## 2020-12-03 PROCEDURE — 97140 MANUAL THERAPY 1/> REGIONS: CPT | Performed by: PHYSICAL THERAPIST

## 2020-12-10 ENCOUNTER — APPOINTMENT (OUTPATIENT)
Dept: PHYSICAL THERAPY | Facility: MEDICAL CENTER | Age: 50
End: 2020-12-10
Payer: OTHER GOVERNMENT

## 2020-12-17 ENCOUNTER — APPOINTMENT (OUTPATIENT)
Dept: PHYSICAL THERAPY | Facility: MEDICAL CENTER | Age: 50
End: 2020-12-17
Payer: OTHER GOVERNMENT

## 2020-12-29 ENCOUNTER — OFFICE VISIT (OUTPATIENT)
Dept: PHYSICAL THERAPY | Facility: MEDICAL CENTER | Age: 50
End: 2020-12-29
Payer: OTHER GOVERNMENT

## 2020-12-29 ENCOUNTER — OFFICE VISIT (OUTPATIENT)
Dept: OBGYN CLINIC | Facility: MEDICAL CENTER | Age: 50
End: 2020-12-29
Payer: OTHER GOVERNMENT

## 2020-12-29 VITALS
SYSTOLIC BLOOD PRESSURE: 127 MMHG | DIASTOLIC BLOOD PRESSURE: 85 MMHG | HEIGHT: 71 IN | HEART RATE: 72 BPM | BODY MASS INDEX: 29.96 KG/M2 | WEIGHT: 214 LBS | TEMPERATURE: 98.4 F

## 2020-12-29 DIAGNOSIS — Z98.890 STATUS POST PROXIMAL ROW CARPECTOMY OF WRIST: Primary | ICD-10-CM

## 2020-12-29 DIAGNOSIS — M25.631 WRIST STIFFNESS, RIGHT: ICD-10-CM

## 2020-12-29 PROCEDURE — 97140 MANUAL THERAPY 1/> REGIONS: CPT | Performed by: PHYSICAL THERAPIST

## 2020-12-29 PROCEDURE — 97110 THERAPEUTIC EXERCISES: CPT | Performed by: PHYSICAL THERAPIST

## 2020-12-29 PROCEDURE — 99213 OFFICE O/P EST LOW 20 MIN: CPT | Performed by: ORTHOPAEDIC SURGERY

## 2021-05-28 ENCOUNTER — RECORDS - HEALTHEAST (OUTPATIENT)
Dept: ADMINISTRATIVE | Facility: CLINIC | Age: 51
End: 2021-05-28

## 2024-12-03 NOTE — PROGRESS NOTES
Chief Complaint     Right wrist pain      History of Present Illness     Jefry Dejesus is a 53 y o  male who continues with right wrist pain and decreased dorsiflexion  Pain is constant at rest and with all activities  He does not notice any return of swelling or cyst   History of aspiration/injection in fall/winter of 2019 with a few weeks symptomatic relief  He has been using his wrist brace  Pain increases with twisting and with any wt bearing activity  Notes that the pain became much worse when he went back to boxing      Past Medical History:   Diagnosis Date    Hyperparathyroidism ([address] )     Hypothyroidism     Kidney stone     Sleep apnea     + Mild Sleep Apne-Uses Dental Device       Past Surgical History:   Procedure Laterality Date    FACIAL/NECK BIOPSY Right 3/4/2020    Procedure: PREAURICULAR SKIN TAG EXCISION;  Surgeon: Cresencio Moreno MD;  [facility];  Service: ENT    FL INJECTION RIGHT SHOULDER (ARTHROGRAM)  12/4/2019    KIDNEY STONE SURGERY      UT EXPLORE PARATHYROID GLANDS Left 3/4/2020    Procedure: LEFT PARATHYROIDECTOMY WITH INTRAOP PTH;  Surgeon: Cresencio Moreno MD;  [facility];  Service: ENT    [id number] EXTRACTION         Allergies   Allergen Reactions    Meloxicam Dermatitis       Current Outpatient Medications on File Prior to Visit   Medication Sig Dispense Refill    acetaminophen (TYLENOL) 325 mg tablet 2, by mouth, every 6 hours as needed for mild to moderate pain   30 tablet 0    calcium carbonate-vitamin D (OSCAL-D) 500 mg-200 units per tablet Take 2 tablets by mouth 3 (three) times a day 180 tablet 0    diclofenac (VOLTAREN) 75 mg EC tablet Take 1 tablet (75 mg total) by mouth 2 (two) times a day PRN for pain 60 tablet 1    ibuprofen (MOTRIN) 200 mg tablet Take 2 tablets (400 mg total) by mouth every 6 (six) hours as needed for moderate pain 120 tablet 0    levothyroxine 200 mcg tablet Take 200 mcg by mouth daily in the early morning       Multiple Vitamin (MULTIVITAMIN) tablet Take 1 tablet by mouth daily      Omega-3 Fatty Acids (FISH OIL OMEGA-3 PO) Take by mouth      oxyCODONE (ROXICODONE) 5 mg immediate release tablet Take 1 tablet (5 mg total) by mouth every 6 (six) hours as needed for severe pain for up to 10 daysMax Daily Amount: 20 mg (Patient not taking: Reported on 3/13/2020) 8 tablet 0     No current facility-administered medications on file prior to visit  Social History     Tobacco Use    Smoking status: Former Smoker     Types: Cigarettes     Start date: 2013    Smokeless tobacco: Former User     Types: Chew     Quit date: 2019   Substance Use Topics    Alcohol use: Yes     Frequency: 2-4 times a month     Drinks per session: 1 or 2     Binge frequency: Never     Comment: Socially    Drug use: Never       Family History   Problem Relation Age of Onset    Diabetes Mother     No Known Problems Father        Review of Systems     As stated in the HPI  All other systems were reviewed and are negative  Physical Exam     /85   Pulse 73   Ht 5' 11" (1 803 m)   Wt 93 9 kg (207 lb)   BMI 28 87 kg/m²     GENERAL: This is a well-developed, well-nourished, age-appropriate patient in no acute distress  The patient is alert and oriented x3  Pleasant and cooperative  Eyes: Anicteric sclerae  Extraocular movements appear intact  HENT: Nares are patent with no drainage  Lungs: There is equal chest rise on inspection  Breathing is non-labored with no audible wheezing  Cardiovascular: No cyanosis  No upper extremity lymphadema  Skin: Skin is warm to touch  No obvious skin lesions or rashes other than described below  Neurologic: No ataxia  Psychiatric: Mood and affect are appropriate  Examination of the right upper extremity shows full range of motion about the elbow in flexion extension, pro supination and digital motion with a DPC of 0   He has decreased wrist extension to about 45° compared to the contralateral which is 80°  Wrist flexion is full  Significant tenderness to palpation in the SL interval   Positive Canas shift test 5/5 Motor to the APB, FDI, FDP2, FDP5, EDC  Sensation intact to light touch in the median, radial, and ulnar nerve distribution  Brisk capillary refill    Data Review     Results Reviewed     None             Imaging:  none    Assessment and Plan           71-year-old male with a chronic SL diastasis with slight DISI deformity  We had a long discussion again about the treatment options for this condition  I discussed with him nonoperative approaches including injection bracing and activity modification which would continue at nausea until he decided he would like to proceed with something more invasive  Further invasive treatment options would involve a ligament reconstruction, which we would have to ensure 1st that his scaphoid is mobile and would need ulnar deviation views to make sure that his scaphoid extended with ulnar deviation  Without fix scaphoid flexion, ligament reconstruction would be a viable option for reducing the space and improving the stability of his wrist, however, this would likely lead to decreased motion and would be a significant recovery time of 3-4 months minimum and there would be limited guarantee on long-term pain relief, limitation of arthritis development, and holding of the ligament reconstruction  Finally, the other options would involve either proximal row carpectomy, 4 corner wrist fusion, or radioscapholunate fusion  I discussed with him the role of wrist arthrodesis, but this would be reserved for future intervention  I discussed the proximal row carpectomy or arthrodesis options would also come with recovery times that are long as well as restriction on activities, but these are viable options for someone with significant pain associated with his condition  Patient would like to proceed with nonoperative modalities still    He did receive at least a month of pain relief from his initial injection and thinks that his pain flared up mostly because of a return to boxing at a gym  I discussed that we should not repeat multiple cortisone injections on a very frequent basis given the risk of advancing his arthritis as there is literature in the knee were old that says that this may exacerbate and advance arthritis more quickly  He is in good understanding of this and the risks of the injection itself and wished to proceed        Follow Up: prn    To Do Next Visit: MRI arthrogram to be considered     PROCEDURES PERFORMED:  Medium joint arthrocentesis: R radiocarpal  Date/Time: 3/13/2020 7:32 AM  Consent given by: patient  Supporting Documentation  Indications: pain   Procedure Details  Location: wrist - R radiocarpal  Needle size: 25 G  Ultrasound guidance: no  Approach: dorsal  Medications administered: 0 5 mL lidocaine 1 %; 6 mg betamethasone acetate-betamethasone sodium phosphate 6 (3-3) mg/mL 4 = No assist / stand by assistance

## (undated) DEVICE — LIGHT HANDLE COVER SLEEVE DISP BLUE STELLAR

## (undated) DEVICE — SUT ETHILON 4-0 PS-2 18 IN 1667H

## (undated) DEVICE — SUT MONOCRYL 4-0 PS-2 18 IN Y496G

## (undated) DEVICE — PAD CAST 4 IN COTTON NON STERILE

## (undated) DEVICE — SUT ETHIBOND 0 CT-1 30 IN X424H

## (undated) DEVICE — KIT F/SWIVELOCK SL 3.5 X 8.5MM DISP

## (undated) DEVICE — 3M™ STERI-STRIP™ REINFORCED ADHESIVE SKIN CLOSURES, R1547, 1/2 IN X 4 IN (12 MM X 100 MM), 6 STRIPS/ENVELOPE: Brand: 3M™ STERI-STRIP™

## (undated) DEVICE — DRAPE C-ARM X-RAY

## (undated) DEVICE — CHLORAPREP HI-LITE 26ML ORANGE

## (undated) DEVICE — INTENDED FOR TISSUE SEPARATION, AND OTHER PROCEDURES THAT REQUIRE A SHARP SURGICAL BLADE TO PUNCTURE OR CUT.: Brand: BARD-PARKER SAFETY BLADES SIZE 15, STERILE

## (undated) DEVICE — SUT SILK 0 SH 30 IN K834H

## (undated) DEVICE — PACK UNIVERSAL NECK

## (undated) DEVICE — BIPOLAR CORD DISP

## (undated) DEVICE — NEEDLE 25GA X 1 IN SAFETY GLIDE

## (undated) DEVICE — CUFF TOURNIQUET 18 X 4 IN QUICK CONNECT DISP 1 BLADDER

## (undated) DEVICE — GLOVE SRG BIOGEL ORTHOPEDIC 7

## (undated) DEVICE — SUT SILK 3-0 SH 30 IN K832H

## (undated) DEVICE — NEEDLE 25G X 1 1/2

## (undated) DEVICE — SUT SILK 3-0 18 IN A184H

## (undated) DEVICE — STERILE BETHLEHEM PLASTIC HAND: Brand: CARDINAL HEALTH

## (undated) DEVICE — DX SWIVELOCK SL, 3.5X8.5MM W/FORK EYELET
Type: IMPLANTABLE DEVICE | Site: WRIST | Status: NON-FUNCTIONAL
Brand: ARTHREX®
Removed: 2020-07-24

## (undated) DEVICE — SUT VICRYL 3-0 SH 27 IN J416H

## (undated) DEVICE — SPONGE 4 X 4 XRAY 16 PLY STRL LF RFD

## (undated) DEVICE — VIAL DECANTER

## (undated) DEVICE — SKIN MARKER DUAL TIP WITH RULER CAP, FLEXIBLE RULER AND LABELS: Brand: DEVON

## (undated) DEVICE — INTENDED FOR TISSUE SEPARATION, AND OTHER PROCEDURES THAT REQUIRE A SHARP SURGICAL BLADE TO PUNCTURE OR CUT.: Brand: BARD-PARKER ® CARBON RIB-BACK BLADES

## (undated) DEVICE — ELECTRODE BLADE MOD E-Z CLEAN  2.75IN 7CM -0012AM

## (undated) DEVICE — CURITY NON-ADHERENT STRIPS: Brand: CURITY

## (undated) DEVICE — SYRINGE 10ML LL

## (undated) DEVICE — SPONGE CHERRY 1/2IN

## (undated) DEVICE — ELECTRODE BLADE MOD E-Z CLEAN 2.5IN 6.4CM -0012M

## (undated) DEVICE — GLOVE INDICATOR PI UNDERGLOVE SZ 8 BLUE

## (undated) DEVICE — TIBURON SPLIT SHEET: Brand: CONVERTORS

## (undated) DEVICE — SCD SEQUENTIAL COMPRESSION COMFORT SLEEVE MEDIUM KNEE LENGTH: Brand: KENDALL SCD

## (undated) DEVICE — SUT SILK 3-0 TIES 144 IN LA54G

## (undated) DEVICE — SUT MONOCRYL 3-0 SH 27 IN Y416H

## (undated) DEVICE — GLOVE SRG BIOGEL 8

## (undated) DEVICE — PAD GROUNDING ADULT

## (undated) DEVICE — PLUMEPEN PRO 10FT